# Patient Record
Sex: MALE | Race: WHITE | NOT HISPANIC OR LATINO | Employment: OTHER | URBAN - METROPOLITAN AREA
[De-identification: names, ages, dates, MRNs, and addresses within clinical notes are randomized per-mention and may not be internally consistent; named-entity substitution may affect disease eponyms.]

---

## 2018-10-09 ENCOUNTER — HOSPITAL ENCOUNTER (OUTPATIENT)
Dept: RADIOLOGY | Facility: MEDICAL CENTER | Age: 83
End: 2018-10-09

## 2018-10-09 ENCOUNTER — HOSPITAL ENCOUNTER (INPATIENT)
Facility: MEDICAL CENTER | Age: 83
LOS: 2 days | DRG: 291 | End: 2018-10-11
Attending: EMERGENCY MEDICINE | Admitting: INTERNAL MEDICINE
Payer: MEDICARE

## 2018-10-09 ENCOUNTER — APPOINTMENT (OUTPATIENT)
Dept: RADIOLOGY | Facility: MEDICAL CENTER | Age: 83
DRG: 291 | End: 2018-10-09
Attending: SURGERY
Payer: MEDICARE

## 2018-10-09 DIAGNOSIS — R06.00 DYSPNEA, UNSPECIFIED TYPE: ICD-10-CM

## 2018-10-09 DIAGNOSIS — E87.6 HYPOKALEMIA: ICD-10-CM

## 2018-10-09 DIAGNOSIS — K81.9 CHOLECYSTITIS: ICD-10-CM

## 2018-10-09 DIAGNOSIS — R60.9 PERIPHERAL EDEMA: ICD-10-CM

## 2018-10-09 DIAGNOSIS — I50.813 ACUTE ON CHRONIC RIGHT-SIDED CONGESTIVE HEART FAILURE (HCC): Primary | ICD-10-CM

## 2018-10-09 DIAGNOSIS — R79.89 ELEVATED LFTS: ICD-10-CM

## 2018-10-09 DIAGNOSIS — I50.9 ACUTE CONGESTIVE HEART FAILURE, UNSPECIFIED HEART FAILURE TYPE (HCC): ICD-10-CM

## 2018-10-09 PROBLEM — E87.1 HYPONATREMIA: Status: ACTIVE | Noted: 2018-10-09

## 2018-10-09 PROBLEM — K80.20 CHOLELITHIASIS: Status: ACTIVE | Noted: 2018-10-09

## 2018-10-09 PROBLEM — E78.5 HYPERLIPIDEMIA: Status: ACTIVE | Noted: 2018-10-09

## 2018-10-09 PROBLEM — N40.0 BENIGN PROSTATIC HYPERPLASIA: Status: ACTIVE | Noted: 2018-10-09

## 2018-10-09 PROBLEM — J45.909 ASTHMA: Status: ACTIVE | Noted: 2018-10-09

## 2018-10-09 PROBLEM — D72.829 LEUKOCYTOSIS: Status: ACTIVE | Noted: 2018-10-09

## 2018-10-09 PROBLEM — M10.9 GOUT: Status: ACTIVE | Noted: 2018-10-09

## 2018-10-09 PROBLEM — I10 HYPERTENSION: Status: ACTIVE | Noted: 2018-10-09

## 2018-10-09 LAB
EKG IMPRESSION: NORMAL
MAGNESIUM SERPL-MCNC: 1.7 MG/DL (ref 1.5–2.5)
PHOSPHATE SERPL-MCNC: 3.3 MG/DL (ref 2.5–4.5)
TROPONIN I SERPL-MCNC: 1.08 NG/ML (ref 0–0.04)

## 2018-10-09 PROCEDURE — 93005 ELECTROCARDIOGRAM TRACING: CPT | Performed by: STUDENT IN AN ORGANIZED HEALTH CARE EDUCATION/TRAINING PROGRAM

## 2018-10-09 PROCEDURE — A9537 TC99M MEBROFENIN: HCPCS

## 2018-10-09 PROCEDURE — 99223 1ST HOSP IP/OBS HIGH 75: CPT | Performed by: INTERNAL MEDICINE

## 2018-10-09 PROCEDURE — 94760 N-INVAS EAR/PLS OXIMETRY 1: CPT

## 2018-10-09 PROCEDURE — 700111 HCHG RX REV CODE 636 W/ 250 OVERRIDE (IP): Performed by: STUDENT IN AN ORGANIZED HEALTH CARE EDUCATION/TRAINING PROGRAM

## 2018-10-09 PROCEDURE — 83735 ASSAY OF MAGNESIUM: CPT

## 2018-10-09 PROCEDURE — 99233 SBSQ HOSP IP/OBS HIGH 50: CPT | Mod: GC | Performed by: INTERNAL MEDICINE

## 2018-10-09 PROCEDURE — 93005 ELECTROCARDIOGRAM TRACING: CPT | Performed by: EMERGENCY MEDICINE

## 2018-10-09 PROCEDURE — 700102 HCHG RX REV CODE 250 W/ 637 OVERRIDE(OP): Performed by: INTERNAL MEDICINE

## 2018-10-09 PROCEDURE — 700102 HCHG RX REV CODE 250 W/ 637 OVERRIDE(OP): Performed by: STUDENT IN AN ORGANIZED HEALTH CARE EDUCATION/TRAINING PROGRAM

## 2018-10-09 PROCEDURE — 99285 EMERGENCY DEPT VISIT HI MDM: CPT

## 2018-10-09 PROCEDURE — 93010 ELECTROCARDIOGRAM REPORT: CPT | Performed by: INTERNAL MEDICINE

## 2018-10-09 PROCEDURE — 84484 ASSAY OF TROPONIN QUANT: CPT | Mod: 91

## 2018-10-09 PROCEDURE — 36415 COLL VENOUS BLD VENIPUNCTURE: CPT

## 2018-10-09 PROCEDURE — 770020 HCHG ROOM/CARE - TELE (206)

## 2018-10-09 PROCEDURE — 90662 IIV NO PRSV INCREASED AG IM: CPT | Performed by: INTERNAL MEDICINE

## 2018-10-09 PROCEDURE — 90471 IMMUNIZATION ADMIN: CPT

## 2018-10-09 PROCEDURE — A9270 NON-COVERED ITEM OR SERVICE: HCPCS | Performed by: INTERNAL MEDICINE

## 2018-10-09 PROCEDURE — 84100 ASSAY OF PHOSPHORUS: CPT

## 2018-10-09 PROCEDURE — A9270 NON-COVERED ITEM OR SERVICE: HCPCS | Performed by: STUDENT IN AN ORGANIZED HEALTH CARE EDUCATION/TRAINING PROGRAM

## 2018-10-09 PROCEDURE — 700111 HCHG RX REV CODE 636 W/ 250 OVERRIDE (IP): Performed by: INTERNAL MEDICINE

## 2018-10-09 RX ORDER — METOLAZONE 5 MG/1
5 TABLET ORAL DAILY
Status: ON HOLD | COMMUNITY
End: 2018-10-10

## 2018-10-09 RX ORDER — FINASTERIDE 5 MG/1
5 TABLET, FILM COATED ORAL DAILY
Status: DISCONTINUED | OUTPATIENT
Start: 2018-10-10 | End: 2018-10-11 | Stop reason: HOSPADM

## 2018-10-09 RX ORDER — ALBUTEROL SULFATE 90 UG/1
2 AEROSOL, METERED RESPIRATORY (INHALATION) EVERY 6 HOURS PRN
COMMUNITY

## 2018-10-09 RX ORDER — FUROSEMIDE 10 MG/ML
80 INJECTION INTRAMUSCULAR; INTRAVENOUS
Status: DISCONTINUED | OUTPATIENT
Start: 2018-10-10 | End: 2018-10-10

## 2018-10-09 RX ORDER — FUROSEMIDE 40 MG/1
80 TABLET ORAL DAILY
Status: ON HOLD | COMMUNITY
End: 2018-10-10

## 2018-10-09 RX ORDER — TRAMADOL HYDROCHLORIDE 50 MG/1
50 TABLET ORAL EVERY 8 HOURS PRN
COMMUNITY

## 2018-10-09 RX ORDER — ZOLPIDEM TARTRATE 5 MG/1
5 TABLET ORAL NIGHTLY PRN
Status: DISCONTINUED | OUTPATIENT
Start: 2018-10-09 | End: 2018-10-11 | Stop reason: HOSPADM

## 2018-10-09 RX ORDER — SIMVASTATIN 40 MG
40 TABLET ORAL EVERY EVENING
Status: DISCONTINUED | OUTPATIENT
Start: 2018-10-09 | End: 2018-10-11 | Stop reason: HOSPADM

## 2018-10-09 RX ORDER — AMOXICILLIN 250 MG
2 CAPSULE ORAL 2 TIMES DAILY
Status: DISCONTINUED | OUTPATIENT
Start: 2018-10-09 | End: 2018-10-11 | Stop reason: HOSPADM

## 2018-10-09 RX ORDER — MONTELUKAST SODIUM 10 MG/1
10 TABLET ORAL DAILY
Status: DISCONTINUED | OUTPATIENT
Start: 2018-10-09 | End: 2018-10-11 | Stop reason: HOSPADM

## 2018-10-09 RX ORDER — ALLOPURINOL 100 MG/1
100 TABLET ORAL DAILY
Status: DISCONTINUED | OUTPATIENT
Start: 2018-10-10 | End: 2018-10-11 | Stop reason: HOSPADM

## 2018-10-09 RX ORDER — TAMSULOSIN HYDROCHLORIDE 0.4 MG/1
0.4 CAPSULE ORAL
Status: DISCONTINUED | OUTPATIENT
Start: 2018-10-09 | End: 2018-10-11 | Stop reason: HOSPADM

## 2018-10-09 RX ORDER — METOLAZONE 5 MG/1
5 TABLET ORAL DAILY
Status: DISCONTINUED | OUTPATIENT
Start: 2018-10-10 | End: 2018-10-09

## 2018-10-09 RX ORDER — BUDESONIDE AND FORMOTEROL FUMARATE DIHYDRATE 160; 4.5 UG/1; UG/1
2 AEROSOL RESPIRATORY (INHALATION)
Status: DISCONTINUED | OUTPATIENT
Start: 2018-10-09 | End: 2018-10-10

## 2018-10-09 RX ORDER — ALBUTEROL SULFATE 90 UG/1
2 AEROSOL, METERED RESPIRATORY (INHALATION) EVERY 6 HOURS PRN
Status: DISCONTINUED | OUTPATIENT
Start: 2018-10-09 | End: 2018-10-11 | Stop reason: HOSPADM

## 2018-10-09 RX ORDER — FUROSEMIDE 10 MG/ML
80 INJECTION INTRAMUSCULAR; INTRAVENOUS ONCE
Status: COMPLETED | OUTPATIENT
Start: 2018-10-09 | End: 2018-10-09

## 2018-10-09 RX ORDER — BISACODYL 10 MG
10 SUPPOSITORY, RECTAL RECTAL
Status: DISCONTINUED | OUTPATIENT
Start: 2018-10-09 | End: 2018-10-11 | Stop reason: HOSPADM

## 2018-10-09 RX ORDER — POLYETHYLENE GLYCOL 3350 17 G/17G
1 POWDER, FOR SOLUTION ORAL
Status: DISCONTINUED | OUTPATIENT
Start: 2018-10-09 | End: 2018-10-11 | Stop reason: HOSPADM

## 2018-10-09 RX ORDER — METOLAZONE 5 MG/1
2.5 TABLET ORAL DAILY
Status: DISCONTINUED | OUTPATIENT
Start: 2018-10-10 | End: 2018-10-09

## 2018-10-09 RX ORDER — FUROSEMIDE 10 MG/ML
20 INJECTION INTRAMUSCULAR; INTRAVENOUS ONCE
Status: DISCONTINUED | OUTPATIENT
Start: 2018-10-09 | End: 2018-10-09 | Stop reason: CLARIF

## 2018-10-09 RX ORDER — PANTOPRAZOLE SODIUM 40 MG/1
40 TABLET, DELAYED RELEASE ORAL DAILY
COMMUNITY

## 2018-10-09 RX ADMIN — TAMSULOSIN HYDROCHLORIDE 0.4 MG: 0.4 CAPSULE ORAL at 17:05

## 2018-10-09 RX ADMIN — ENOXAPARIN SODIUM 40 MG: 100 INJECTION SUBCUTANEOUS at 16:18

## 2018-10-09 RX ADMIN — SIMVASTATIN 40 MG: 40 TABLET, FILM COATED ORAL at 17:05

## 2018-10-09 RX ADMIN — FUROSEMIDE 80 MG: 10 INJECTION, SOLUTION INTRAMUSCULAR; INTRAVENOUS at 16:19

## 2018-10-09 RX ADMIN — MONTELUKAST SODIUM 10 MG: 10 TABLET, FILM COATED ORAL at 17:05

## 2018-10-09 RX ADMIN — INFLUENZA A VIRUS A/MICHIGAN/45/2015 X-275 (H1N1) ANTIGEN (FORMALDEHYDE INACTIVATED), INFLUENZA A VIRUS A/SINGAPORE/INFIMH-16-0019/2016 IVR-186 (H3N2) ANTIGEN (FORMALDEHYDE INACTIVATED), AND INFLUENZA B VIRUS B/MARYLAND/15/2016 BX-69A (A B/COLORADO/6/2017-LIKE VIRUS) ANTIGEN (FORMALDEHYDE INACTIVATED) 0.5 ML: 60; 60; 60 INJECTION, SUSPENSION INTRAMUSCULAR at 20:42

## 2018-10-09 RX ADMIN — POTASSIUM BICARBONATE 50 MEQ: 25 TABLET, EFFERVESCENT ORAL at 17:05

## 2018-10-09 RX ADMIN — BUDESONIDE AND FORMOTEROL FUMARATE DIHYDRATE 2 PUFF: 160; 4.5 AEROSOL RESPIRATORY (INHALATION) at 18:56

## 2018-10-09 ASSESSMENT — ENCOUNTER SYMPTOMS
PND: 1
SHORTNESS OF BREATH: 1
FEVER: 0
BLURRED VISION: 0
VOMITING: 0
DIZZINESS: 0
COUGH: 0
BRUISES/BLEEDS EASILY: 0
NAUSEA: 0
HEADACHES: 0
POLYDIPSIA: 0
MYALGIAS: 0
ORTHOPNEA: 1
DOUBLE VISION: 0
WHEEZING: 0
BACK PAIN: 1
FALLS: 0
DIARRHEA: 0
ABDOMINAL PAIN: 1
CHILLS: 0
ABDOMINAL PAIN: 0
NERVOUS/ANXIOUS: 0

## 2018-10-09 ASSESSMENT — LIFESTYLE VARIABLES
DO YOU DRINK ALCOHOL: NO
EVER_SMOKED: YES
SUBSTANCE_ABUSE: 0
EVER_SMOKED: YES

## 2018-10-09 ASSESSMENT — COGNITIVE AND FUNCTIONAL STATUS - GENERAL
SUGGESTED CMS G CODE MODIFIER DAILY ACTIVITY: CH
MOBILITY SCORE: 24
DAILY ACTIVITIY SCORE: 24
SUGGESTED CMS G CODE MODIFIER MOBILITY: CH

## 2018-10-09 ASSESSMENT — COPD QUESTIONNAIRES
DO YOU EVER COUGH UP ANY MUCUS OR PHLEGM?: NO/ONLY WITH OCCASIONAL COLDS OR INFECTIONS
DURING THE PAST 4 WEEKS HOW MUCH DID YOU FEEL SHORT OF BREATH: NONE/LITTLE OF THE TIME
HAVE YOU SMOKED AT LEAST 100 CIGARETTES IN YOUR ENTIRE LIFE: YES
COPD SCREENING SCORE: 4

## 2018-10-09 ASSESSMENT — PATIENT HEALTH QUESTIONNAIRE - PHQ9
1. LITTLE INTEREST OR PLEASURE IN DOING THINGS: NOT AT ALL
2. FEELING DOWN, DEPRESSED, IRRITABLE, OR HOPELESS: NOT AT ALL
SUM OF ALL RESPONSES TO PHQ9 QUESTIONS 1 AND 2: 0

## 2018-10-09 ASSESSMENT — PAIN SCALES - GENERAL
PAINLEVEL_OUTOF10: 0
PAINLEVEL_OUTOF10: 0

## 2018-10-09 NOTE — ED NOTES
Report to floor RN. Floor made this RN aware that a tele monitor will be brought to bedside so pt can go to [facility] and then to floor.

## 2018-10-09 NOTE — H&P
"      Internal Medicine Admitting History and Physical    Note Author: Glen Ratliff M.D.       Name Thuy Guidry 1935   Age/Sex 82 y.o. male   MRN [id number]   Code Status Full     After 5PM or if no immediate response to page, please call for cross-coverage  Attending/Team: Dr. Wilkins See Patient List for primary contact information  Call [phone] to page    1st Call - Day Intern (R1):   Dr. Ratliff 2nd Call - Day Sr. Resident (R2/R3):   Dr. Corado       Chief Complaint:   Shortness of breath    HPI:  Mr. Ennis is an 82 year old gentleman with a past medical history significant for heart failure with preserved ejection fraction, aortic and mitral stenosis s/p valve replacement, dyslipidemia, hypertension, and BPH who presents complaining of a 1-week history of shortness of breath exacerbated by recumbency and activity. He endorses a long history of paroxysmal nocturnal dyspnea, which he states has not been any worse lately, as well as significant orthopnea. He has also noticed weight gain of approximately 10 pounds in the past 2 days accompanied by peripheral edema, especially in the lower extremities. He recently saw his cardiologist Dr. Knox, in [place], on 2018, where he was told that his \"aortic valve was getting tight.\" Dr. Knox was also concerned about his mitral stenosis.     He denies any history, recent or remote, of chest pain, nausea, vomiting, or jaw pain.       Review of Systems   Constitutional: Negative for chills and fever.   Eyes: Negative for blurred vision and double vision.   Respiratory: Positive for shortness of breath. Negative for cough and wheezing.    Cardiovascular: Positive for orthopnea, leg swelling and PND. Negative for chest pain.   Gastrointestinal: Negative for abdominal pain, diarrhea, nausea and vomiting.   Genitourinary: Negative for dysuria, frequency, hematuria and urgency.   Musculoskeletal: Negative for falls and myalgias.   Skin: " Negative for itching and rash.   Neurological: Negative for dizziness and headaches.   Endo/Heme/Allergies: Negative for polydipsia. Does not bruise/bleed easily.   Psychiatric/Behavioral: Negative for substance abuse. The patient is not nervous/anxious.              Past Medical History (Chronic medical problem, known complications and current treatment)    HFpEF, last EF 60-65% in 3/2018  DLD  HTN  BPH   Gout    Past Surgical History:  Past Surgical History:   Procedure Laterality Date   • OTHER      'open heart surgery'   • OTHER ABDOMINAL SURGERY      3x hernia repair       Current Outpatient Medications:  Home Medications     Reviewed by Ofelia Frausto (Pharmacy Tech) on 10/09/18 at 1119  Med List Status: Complete   Medication Last Dose Status   albuterol 108 (90 Base) MCG/ACT Aero Soln inhalation aerosol 2 DAYS Active   allopurinol (ZYLOPRIM) 100 MG Tab 10/8/2018 Active   aspirin EC (ECOTRIN) 81 MG Tablet Delayed Response 10/8/2018 Active   dutasteride (AVODART) 0.5 MG capsule 10/8/2018 Active   fluticasone-salmeterol (ADVAIR) 250-50 MCG/DOSE AEROSOL POWDER, BREATH ACTIVATED 10/7/2018 Active   furosemide (LASIX) 40 MG Tab 10/8/2018 Active   metOLazone (ZAROXOLYN) 5 MG Tab 10/8/2018 Active   montelukast (SINGULAIR) 10 MG Tab 10/8/2018 Active   Multiple Vitamins-Minerals (PRESERVISION AREDS 2 PO) 10/8/2018 Active   pantoprazole (PROTONIX) 40 MG Tablet Delayed Response 10/8/2018 Active   potassium chloride SA (KDUR) 20 MEQ Tab CR 10/9/2018 Active   silodosin (RAPAFLO) 8 MG Cap capsule 10/8/2018 Active   simvastatin (ZOCOR) 40 MG Tab 10/8/2018 Active   tramadol (ULTRAM) 50 MG Tab 4 DAYS Active   zolpidem (AMBIEN) 5 MG Tab 10/7/2018 Active                Medication Allergy/Sensitivities:  No Known Allergies      Family History (mandatory)   Family History   Problem Relation Age of Onset   • No Known Problems Mother         Lived to >100 years old   • Cancer Father          of unknown cancer @ 32 years  "old       Social History (mandatory)   Social History     Social History   • Marital status:      Spouse name: N/A   • Number of children: N/A   • Years of education: N/A     Occupational History   • Not on file.     Social History Main Topics   • Smoking status: Former Smoker   • Smokeless tobacco: Never Used   • Alcohol use Yes      Comment: 2-3 drinks nightly   • Drug use: No   • Sexual activity: Not on file     Other Topics Concern   • Not on file     Social History Narrative   • No narrative on file     Living situation: Lives with wife in [place]  PCP : Pcp Not In Computer    Physical Exam     Vitals:    10/09/18 1033 10/09/18 1200 10/09/18 1551 10/09/18 1600   BP: 122/58  105/57    Pulse: 79 78 76 83   Resp: 18 16 20 18   Temp: 36.6 °C (97.8 °F)  36.1 °C (97 °F)    SpO2: 96% 96% 92% 94%   Weight: 94.3 kg (208 lb)      Height: 1.676 m (5' 6\")        Body mass index is 33.57 kg/m².  /57   Pulse 83   Temp 36.1 °C (97 °F)   Resp 18   Ht 1.676 m (5' 6\")   Wt 94.3 kg (208 lb)   SpO2 94%   BMI 33.57 kg/m²   O2 therapy: Pulse Oximetry: 94 %, O2 (LPM): 0, O2 Delivery: None (Room Air)    Physical Exam   Constitutional: He is oriented to person, place, and time.   HENT:   Mallampati 2   Cardiovascular: Normal rate, regular rhythm and intact distal pulses.    5/6 holosystolic murmur radiating to carotids  +thrill  No JVD   Pulmonary/Chest: He has no rales.   Lungs clear to auscultation bilaterally, no wheezes or crackles   Abdominal: Bowel sounds are normal.   Distended abdominal veins, telangiectasias, obese, no hepatosplenomegaly, no distension, no tenderness   Musculoskeletal: He exhibits edema (3+ pitting edema bilaterally).   Neurological: He is alert and oriented to person, place, and time.   Skin:   Lower extremities cool bilaterally         Data Review       Old Records Request:   Completed  Current Records review/summary: Deferred    Lab Data Review:  Recent Results (from the past 24 " hour(s))   CBC WITH DIFFERENTIAL    Collection Time: 10/09/18  5:40 AM   Result Value Ref Range    WBC 23.7 (H) 4.8 - 10.8 K/uL    RBC 4.50 (L) 4.70 - 6.10 M/uL    Hemoglobin 13.1 (L) 14.0 - 18.0 g/dL    Hematocrit 38.7 (L) 42.0 - 52.0 %    MCV 86.0 80.0 - 94.0 fL    MCH 29.1 28.7 - 33.1 pg    MCHC 33.9 33.0 - 37.0 g/dL    RDW 17.7 (H) 11.5 - 14.5 %    Platelet Count 186 130 - 400 K/uL    MPV 14.3 (H) 7.4 - 10.4 fL    Neutrophils Automated 80.5 (H) 39.0 - 70.0 %    Lymphocytes Automated 10.3 (L) 21.0 - 50.0 %    Monocytes Automated 8.5 1.7 - 10.0 %    Eosinophils Automated 0.5 0.0 - 5.0 %    Basophils Automated 0.2 0.0 - 3.0 %    Abs Neutrophils Automated 19.0 (H) 1.8 - 7.7 K/uL    Abs Lymph Automated 2.4 1.2 - 4.8 K/uL    Monos (Absolute) 2.0 (H) 0.2 - 0.9 K/uL    Neutrophils-Polys 80 (H) 39 - 70 %    Lymphocytes 11 (L) 21 - 50 %    Monocytes 6 2 - 9 %    Nucleated RBC 1 /100 WBC    Neutrophils (Absolute) 19.7 (H) 1.8 - 7.7 K/uL   COMP METABOLIC PANEL    Collection Time: 10/09/18  5:40 AM   Result Value Ref Range    Sodium 131 (L) 136 - 145 mmol/L    Potassium 4.2 3.5 - 5.1 mmol/L    Chloride 99 98 - 107 mmol/L    Co2 19 (L) 20 - 29 mmol/L    Anion Gap 13 (H) 4 - 12 mmol/L    Glucose 121 (H) 70 - 100 mg/dL    Creatinine 1.2 0.7 - 1.3 mg/dL    Calcium 8.5 8.5 - 10.1 mg/dL    AST(SGOT) 195 (H) 10 - 37 U/L    ALT(SGPT) 550 (H) 12 - 78 U/L    Alkaline Phosphatase 305 (H) 34 - 120 U/L    Total Bilirubin 0.7 0.1 - 1.2 mg/dL    Albumin 2.8 (L) 3.5 - 5.0 g/dL    Total Protein 6.3 (L) 6.4 - 8.3 g/dL    A-G Ratio 0.8     Bun 38 (H) 9 - 25 mg/dL   TROPONIN    Collection Time: 10/09/18  5:40 AM   Result Value Ref Range    Troponin I 0.56 (HH) 0.00 - 0.06 ng/mL   BTYPE NATRIURETIC PEPTIDE    Collection Time: 10/09/18  5:40 AM   Result Value Ref Range    B Natriuretic Peptide 13231 pg/mL   VENOUS BLOOD GAS    Collection Time: 10/09/18  5:40 AM   Result Value Ref Range    Venous Bg Ph 7.52 (H) 7.35 - 7.45    Venous Bg Pco2 23.4  (L) 36.0 - 48.0 mmHg    Venous Bg Po2 54.4 mmHg    Venous Bg Hco3 18.9 (L) 24.0 - 28.0 mmol/L    Venous Bg Base Excess -2.0 -3.0 - 3.0 mmol/L   ESTIMATED GFR    Collection Time: 10/09/18  5:40 AM   Result Value Ref Range    GFR If African American >60 >60 mL/min/1.73 m 2    GFR If Non African American 58 (A) >60 mL/min/1.73 m 2   DIFFERENTIAL MANUAL    Collection Time: 10/09/18  5:40 AM   Result Value Ref Range    Bands-Stabs 3 0 - 6 %    Plt Estimation Adequate     Polychromia 1+    LACTIC ACID    Collection Time: 10/09/18  7:25 AM   Result Value Ref Range    Lactic Acid 2.6 (H) 0.0 - 2.0 mmol/L   TROPONIN    Collection Time: 10/09/18  7:25 AM   Result Value Ref Range    Troponin I 0.53 (HH) 0.00 - 0.06 ng/mL   URINALYSIS CULTURE, IF INDICATED    Collection Time: 10/09/18  8:45 AM   Result Value Ref Range    Color YELLOW     Character CLEAR     Specific Gravity 1.010 <1.035    Ph 7.0 5.0 - 8.0    Glucose NEGATIVE Negative mg/dL    Ketones NEGATIVE Negative mg/dL    Protein NEGATIVE Negative mg/dL    Bilirubin NEGATIVE Negative    Urobilinogen, Urine 0.2 Negative    Nitrite NEGATIVE Negative    Leukocyte Esterase NEGATIVE Negative    Occult Blood NEGATIVE Negative    Culture Indicated No UA Culture   EKG    Collection Time: 10/09/18 10:42 AM   Result Value Ref Range    Report       [facility]    Test Date:  2018-10-09  Pt Name:    RIMA GARDNER            Department: ER  MRN:        [id number]                      Room:        03  Gender:     Male                         Technician: [id number]  :        1935                   Requested By:ER TRIAGE PROTOCOL  Order #:    [id number]                    Reading MD: VIGNESH RAMOS MD    Measurements  Intervals                                Axis  Rate:       79                           P:          45  NJ:         216                          QRS:        52  QRSD:       130                          T:          234  QT:          412  QTc:        473    Interpretive Statements  SINUS RHYTHM  BORDERLINE AV CONDUCTION DELAY  PROBABLE LEFT ATRIAL ABNORMALITY  LEFT BUNDLE BRANCH BLOCK  No previous ECG available for comparison    Electronically Signed On 10-9-2018 11:51:17 PDT by VIGNESH RAMOS MD         Imaging/Procedures Review:    Independant Imaging Review: Completed  NM-HEPATOBILIARY SCAN   Final Result      No scintigraphic evidence of cholecystitis or biliary obstruction      OUTSIDE IMAGES-CT CHEST   Final Result      EC-ECHOCARDIOGRAM COMPLETE W/O CONT    (Results Pending)            EKG:   EKG Independant Review: Completed  QTc:473, HR: 79, Normal Sinus Rhythm, left bundle branch block, p pulmonale    Records reviewed and summarized in current documentation :  Yes         Assessment/Plan     Acute on chronic right-sided congestive heart failure (HCC)- (present on admission)   Assessment & Plan    Assessment: Patient presents with shortness of breath, however he has no findings of crackles on lung exam and only minimal fissure fluid on CT. He does have significant peripheral edema, however, and an elevated BNP of ~35591 as well as elevated transaminases likely secondary due to hepatic congestion. This appears to be more of a right-sided HF picture due to the relative sparing of his lungs.     He takes lasix at home, 40mg PO BID and Metolazone for a known history of heart failure with preserved ejection fraction, last EF 60-65% in 3/2018. States this is secondary to his use of fenfluramine/phentermine previously.  He does have a history of aortic and mitral valve replacement and follows with cardiology for mitral stenosis and aortic stenosis, and physical exam demonstrates a loud 5/6 systolic murmur radiating to carotids indicative of aortic stenosis.     Troponins have been elevated and holding stable, 0.56 -> 0.53 -> 1.08, likely due to demand ischemia. EKG shows LBBB with p waves indicative of possible tricuspid regurgitation vs  pulmonary hypertension.    Patient had some olivia-cholecystic fluid which is likely due to congestion as HIDA scan was negative.    Plan:  Echocardiogram pending, cardiology on board  Lasix 80mg IV once, will do 40 TID IV tomorrow.   Continue metolazone, ASA  Follow transaminases to ensure resolution  Trend troponin & EKG x1 more time  Strict I/O  Daily weights  Low-sodium diet        Asthma   Assessment & Plan    Patient endorses history of asthma, says it has been improving and that he hardly ever uses his inhalers anymore. Continuing to offer home inhalers PRN. Imaging does demonstrate centrilobular emphysema and patient has a (remote) significant smoking history indicating likely underlying COPD.        Leukocytosis- (present on admission)   Assessment & Plan    Assessment: Quite elevated with elevated ANC, for a patient who does not appear to have any signs of sepsis, possibly reactive.    Plan:  Continue to follow, consider procalcitonin tomorrow if does not decrease        Elevated LFTs- (present on admission)   Assessment & Plan    Assessment: Likely secondary to hepatic congestion, not cholestatic picture with normal bilirubin although does have elevated ALP, HIDA scan was negative    Plan  Treat acute exacerbation of heart failure, follow transaminases        Hyponatremia   Assessment & Plan    Likely secondary to retention of free water from heart failure, will follow as his CHF exacerbation is treated        Gout   Assessment & Plan    Continue home allopurinol        Hypertension   Assessment & Plan    Currently not hypertensive        Benign prostatic hyperplasia   Assessment & Plan    Finasteride and tamsulosin substituted for home medications        Hyperlipidemia   Assessment & Plan    Continue simvastatin            Anticipated Hospital stay:  >2 midnights        Quality Measures  Quality-Core Measures   Reviewed items::  Labs reviewed, Medications reviewed, EKG reviewed and Radiology images  reviewed  Bull catheter::  No Bull  DVT prophylaxis pharmacological::  Enoxaparin (Lovenox)    PCP: Pcp Not In Computer

## 2018-10-09 NOTE — ASSESSMENT & PLAN NOTE
No abdominal pain  Ultrasound - there are multiple gallstones present.   01/9 - HIDA  Gallbladder activity is confirmed by 30 minutes / No scintigraphic evidence of cholecystitis or biliary obstruction

## 2018-10-09 NOTE — SENIOR ADMIT NOTE
"Mr. Guidry is a 82 y.o. male with PMHx of  CHF, MVR, HTN, COPD who presented to ED for the worsening of LY and SOB. He has known CHF and is on lasix 40 BID + metolazone 5mg, which was increased recently, but pt didn't feel good on higher dose so he cut back to  40 bid. He reports 10 lbs weight gain in past 2 days.     Pt is from [place] and was in [facility] for the vacation. Denies excessive intake of salt in his diet. Denies any chest pain or discomfort. Denies any wheezing, excessive sputum production. Denies any fever/ chills,N/V, pain abdomen, change in bowel movement.    In ED, he had his BMP was elevated, he received 20 mg of lasix, his vitals were WNL.   CT thorax showed small fluid in R major fissure, centrilobar emphysema. Due to pt's elevated liver enzymes with alk phos RUQ US was done that showed \"Cholelithiasis with gallbladder wall thickening and pericholecystic fluid findings suggest acute cholecystitis\". Surgery was consulted.     Exam:  /57   Pulse 83   Temp 36.1 °C (97 °F)   Resp 18   Ht 1.676 m (5' 6\")   Wt 94.3 kg (208 lb)   SpO2 94%   BMI 33.57 kg/m²     General; laying on the bed, looks comfortable, speaking in full sentences  HEENT: AT/NC, PERRLA, EOMI, neck supple, MMM  CHest: CTAB  CVS: RRR, S1S2, holosystolic murmur on Aortic area, no JVP, 3+ pedal edema up to shin.   Abdomen: soft non tender  Neuro: a&O x 3, no FD  Extremity: pitting edema 3+ up to shin   Psych: mood and affect appropriate       Labs:  WBC 23.7  , Cr 1.2, BUN 38, LA 2.6  , , Alk phos 305  Trop 0.5  BNP 35224    EKG: HR 80, NSR, CRIS, LVH  RUQ US:   Cholelithiasis with gallbladder wall thickening and pericholecystic fluid findings suggest acute cholecystitis.  2.  Fatty change of liver.  NM-HEPATOBILIARY SCAN   Final Result      No scintigraphic evidence of cholecystitis or biliary obstruction      OUTSIDE IMAGES-CT CHEST   Final Result      EC-ECHOCARDIOGRAM COMPLETE W/O CONT    " (Results Pending)       Assessment and Plan:  # worsening dyspnea  - 2/2 acute decompensated HF Vs worsening valvular disease VS COPDE, less likely infectious etiology like PNA, has leukocytosis but CT chest non evident of PNA changes,no other sign of systemic infection    - obtain records from Cardiologist   - diuresis with IV lasix and metolazone- total of 100mg IV today, continue monitoring BMP for the guidance of diuresis. K supplement   - ECHO     # acute decompensated HF  - hx of HFpEF,   - on diuresis, no ACE/ARB or BB   - continue diuresis, see above, ECHO   - cardiology consult     # transaminitis   - US RUQ suggestive of cholecystitis, pt is asymptomatic. transaminitis can be from hepatic congestin from RHF  - surgery on board- HIDA negative, follow clinically     # leukocytosis   - no other sign of systemic infection at this point.  - continue to follow    # troponemia  Mildly elevated at 0.5 likely demand ischemia    # HTN  - on amlodipine, continue   - watchful on diuretic therapy    # COPD  - stable, doesn't appear in exacerbation, continue home inhalers     # Gout  - no acute flare  - continue allopurinol       For further details of Assessment & Plan, please refer to H&P by Dr. Ratliff from today

## 2018-10-09 NOTE — PROGRESS NOTES
2 RN skin check performed with ROBLES Mohr.     Trace edema to BLE  Sacrum intact  Right ear scabbed in 3 places  Right collar bone with a small scab, patient reports these scabs are healed shingles lesions from a few weeks ago. Per the charge RN the patient does not need to be isolated.

## 2018-10-09 NOTE — ASSESSMENT & PLAN NOTE
Ultrasound - there are multiple gallstones present. There is mild wall thickening measuring 4 mm.   There is a small amount of pericholecystic fluid.  CBD:  5 mm, which is normal. WBC 23  No abdominal pain  HIDA scan ordered - if filling of gallbladder then no cholecystitis

## 2018-10-09 NOTE — ED TRIAGE NOTES
Thuy Guidry  Pt bib EMS. Pt changed into gown and placed on monitor.     Chief Complaint   Patient presents with   • Shortness of Breath   • Peripheral Edema     Pt was transferred from [facility] for further evaluation. Pt presents with SOB for over 1 wk now. Pt states s/s increased after returning home from [place]. Pt's PCP increased pt's dosage of lasix and metolazone d/t increased peripheral edema, but symptoms did not improve. Pt also c/o lower RIGHT back pain that may be a probable cholecystitis.   Dr Aguilera at bedside now.

## 2018-10-09 NOTE — CONSULTS
General Surgical Consult  10/9/2018    Requesting  Physician: Dr Dhiraj Palumbo    Consulting Physician: Bong Cantu MD.     CC:  Elevated LFT's and gallstones in patient with congestive heart failure    HPI: This is a 82 y.o male presents to [facility] Emergency Department as a transfer from [facility]  for evaluation of shortness of breath onset approximately 1 week ago with associated worsening peripheral edema and weight gain.  Patient reports that the difficulty breathing is exacerbated with any exertion. Patient has a history of CHF, stating that his Lasix and metolazone prescriptions have recently been increased by his PCP with no real change in the leg swelling. Patient lives in [place], and was visiting [facility] on vacation, arriving 5 days ago - planning on staying for 2 weeks.  The patient reports that his symptoms began just prior to leaving on vacation, gradually worsening over his trip and rise in elevation.  He has gained 10 lbs of weight over the last 48 hrs. No complaints of fever, chills, chest pain. Patient has undergone two valve replacements, and a recent evaluation from his Cardiologist showed some aortic valve tightness. Patient was evaluated for his shortness of breath at [facility].  He denies abdominal pain. He has had no nausea or vomiting.    Past Medical History:   Diagnosis Date   • Congestive heart failure (HCC)    • High cholesterol    • Hypertension        Past Surgical History:   Procedure Laterality Date   • OTHER      'open heart surgery'   • OTHER ABDOMINAL SURGERY      3x hernia repair       Current Facility-Administered Medications   Medication Dose Route Frequency Provider Last Rate Last Dose   • senna-docusate (PERICOLACE or SENOKOT S) 8.6-50 MG per tablet 2 Tab  2 Tab Oral BID Glen Ratliff M.D.        And   • polyethylene glycol/lytes (MIRALAX) PACKET 1 Packet  1 Packet Oral QDAY PRN Glen Ratliff M.D.        And   • magnesium  hydroxide (MILK OF MAGNESIA) suspension 30 mL  30 mL Oral QDAY PRN Glen Ratliff M.D.        And   • bisacodyl (DULCOLAX) suppository 10 mg  10 mg Rectal QDAY PRN Glen Ratliff M.D.       • Respiratory Care per Protocol   Nebulization Continuous RT Glen Ratliff M.D.       • enoxaparin (LOVENOX) inj 40 mg  40 mg Subcutaneous DAILY Glen Ratliff M.D.       • albuterol inhaler 2 Puff  2 Puff Inhalation Q6HRS PRN Jorge Wilkins M.D.       • allopurinol (ZYLOPRIM) tablet 100 mg  100 mg Oral DAILY Jorge Wilkins M.D.       • aspirin EC (ECOTRIN) tablet 81 mg  81 mg Oral DAILY Jorge Wilkins M.D.       • dutasteride (AVODART) capsule 0.5 mg  0.5 mg Oral DAILY Jorge Wilkins M.D.       • fluticasone-salmeterol (ADVAIR) 250-50 MCG/DOSE inhaler 1 Puff  1 Puff Inhalation Q12HRS Jorge Wilkins M.D.       • metOLazone (ZAROXOLYN) tablet 5 mg  5 mg Oral DAILY Jorge Wilkins M.D.       • montelukast (SINGULAIR) tablet 10 mg  10 mg Oral DAILY Jorge Wilkins M.D.       • silodosin (RAPAFLO) capsule 8 mg  8 mg Oral AFTER DINNER Jorge Wilkins M.D.       • simvastatin (ZOCOR) tablet 40 mg  40 mg Oral Q EVENING Jorge Wilkins M.D.       • zolpidem (AMBIEN) tablet 5 mg  5 mg Oral HS PRN Jorge Wilkins M.D.         Current Outpatient Prescriptions   Medication Sig Dispense Refill   • Multiple Vitamins-Minerals (PRESERVISION AREDS 2 PO) Take 1 Tab by mouth 2 Times a Day.     • albuterol 108 (90 Base) MCG/ACT Aero Soln inhalation aerosol Inhale 2 Puffs by mouth every 6 hours as needed for Shortness of Breath.     • furosemide (LASIX) 40 MG Tab Take 80 mg by mouth every day.     • pantoprazole (PROTONIX) 40 MG Tablet Delayed Response Take 40 mg by mouth every day.     • metOLazone (ZAROXOLYN) 5 MG Tab Take 5 mg by mouth every day.     • tramadol (ULTRAM) 50 MG Tab Take 50 mg by mouth every 8 hours as needed.     • fluticasone-salmeterol (ADVAIR) 250-50 MCG/DOSE AEROSOL POWDER, BREATH ACTIVATED Inhale 1 Puff by mouth every 12 hours.      • allopurinol (ZYLOPRIM) 100 MG Tab TAKE 1 TABLET EVERY MORNING FOR GOUT PREVENTION     • dutasteride (AVODART) 0.5 MG capsule TAKE ONE CAPSULE EVERY AFTERNOON FOR ENLARGED PROSTATE     • potassium chloride SA (KDUR) 20 MEQ Tab CR Take 20-40 mEq by mouth 4 times a day. 40 meq AM  40 meq afternoon  40 meq evening  20 meq middle of night     • silodosin (RAPAFLO) 8 MG Cap capsule TAKE ONE CAPSULE EVERY AFTERNOON FOR ENLARGED PROSTATE     • simvastatin (ZOCOR) 40 MG Tab Take 40 mg by mouth every evening.     • montelukast (SINGULAIR) 10 MG Tab Take 10 mg by mouth every day.     • zolpidem (AMBIEN) 5 MG Tab Take 10 mg by mouth at bedtime as needed for Sleep.     • aspirin EC (ECOTRIN) 81 MG Tablet Delayed Response Take 81 mg by mouth every day.         Social History     Social History   • Marital status:      Spouse name: N/A   • Number of children: N/A   • Years of education: N/A     Occupational History   • Not on file.     Social History Main Topics   • Smoking status: Former Smoker   • Smokeless tobacco: Never Used   • Alcohol use Yes      Comment: 2-3 drinks nightly   • Drug use: No   • Sexual activity: Not on file     Other Topics Concern   • Not on file     Social History Narrative   • No narrative on file       History reviewed. No pertinent family history.    Allergies:  Patient has no known allergies.    Review of Systems:  Constitutional: Negative for fever, chills, weight loss, malaise/fatigue and diaphoresis.   HENT: Negative for hearing loss, ear pain, nosebleeds, congestion, sore throat, neck pain, and ear discharge.    Eyes: Negative for blurred vision, double vision, and redness.   Respiratory: Negative for cough, sputum production, shortness of breath, wheezing and stridor.  Positive for shortness of breath.  Cardiovascular: Negative for chest pain, palpitations.   Gastrointestinal: Negative for heartburn, nausea, vomiting, abdominal pain, diarrhea, constipation.  Genitourinary: Negative  "for dysuria, urgency, frequency.   Musculoskeletal: Negative for myalgias, back pain, joint pain and falls.   Skin: Negative for itching and rash.  Neurological: Negative for dizziness, loss of consciousness, weakness and headaches.   Endo/Heme/Allergies: Negative for environmental allergies. Does not bruise/bleed easily.   Psychiatric/Behavioral: Negative for depression and substance abuse. The patient is not nervous/anxious.    Physical Exam:  Blood pressure 122/58, pulse 78, temperature 36.6 °C (97.8 °F), resp. rate 16, height 1.676 m (5' 6\"), weight 94.3 kg (208 lb), SpO2 96 %.    Constitutional: Awake, alert, oriented x3.  No acute distress.   Head: No cephalohematoma. Pupils 2-3 mm reactive bilaterally.  No malocclusion.    Neck: No tracheal deviation.  No JVD. No JVD.  FROM  Cardiovascular: Normal rate, regular rhythm, normal heart sounds and intact distal pulses.  Exam reveals no gallop and no friction rub.  No murmur heard.  Pulmonary/Chest:  There is no any chest wall tenderness.  No crepitus. Positive breath sounds bilaterally. BS clear.  Abdominal: Soft, slightly distended. Nontender to palpation. No guarding or rebound tenderness.  Musculoskeletal:  2+ pitting edema BLE extending above his knees.   Right upper extremity grossly atraumatic, palpable radial pulse. 5/5  strength. Full ROM and strength at elbow.  Left upper extremity grossly atraumatic, palpable radial pulse. 5/5  strength. Full ROM and strength at elbow.  Right lower extremity grossly atraumatic. 5/5 strength in ankle plantar flexion and dorsiflexion. No pain and full ROM at right knee and hip.   Left  lower extremity grossly atraumatic. 5/5 strength in ankle plantar flexion and dorsiflexion. No pain and full ROM at left knee and hip.   Back: Midline thoracic and lumbar spines are nontender to palpation.   : Normal male external genitalia. Rectal exam not done.   Neurological: Sensation intact to light touch dorsum and plantar " surfaces of both feet and the medial and lateral aspects of both lower legs.  Sensation intact to light touch dorsum and plantar surfaces of both hands.   Skin: Skin is warm and dry.  No diaphoresis. No erythema. No pallor.     Labs:  Recent Labs      10/09/18   0540   WBC  23.7*   RBC  4.50*   HEMOGLOBIN  13.1*   HEMATOCRIT  38.7*   MCV  86.0   MCH  29.1   MCHC  33.9   RDW  17.7*   PLATELETCT  186   MPV  14.3*     Recent Labs      10/09/18   0540   SODIUM  131*   POTASSIUM  4.2   CHLORIDE  99   CO2  19*   GLUCOSE  121*   BUN  38*   CREATININE  1.2   CALCIUM  8.5         Recent Labs      10/09/18   0540   ASTSGOT  195*   ALTSGPT  550*   TBILIRUBIN  0.7   ALKPHOSPHAT  305*       Radiology:  OUTSIDE IMAGES-CT CHEST   Final Result      NM-HEPATOBILIARY SCAN    (Results Pending)   EC-ECHOCARDIOGRAM COMPLETE W/O CONT    (Results Pending)         Assessment: This is a 82 y.o with CHF exacerbation, elevated LFT's.    Plan:   Admit to medical service.  Diuresis  HIDA scan ordered    Acute on chronic right-sided congestive heart failure (HCC)  Weight gain - 10 lbs in 48 hr  BNP > 18K  2-3 + pitting edema of BLE      Elevated LFTs  Elevated AST  195, ALT  550, TB   0.7, ALK P 305.  Likely related to cardiac failure  Follow    Leukocytosis  WBC 23.7  No evidence of pneumonia on CT  UA pending  Denies abdominal pain    Cholecystitis  Ultrasound - there are multiple gallstones present. There is mild wall thickening measuring 4 mm.   There is a small amount of pericholecystic fluid.  CBD:  5 mm, which is normal. WBC 23  No abdominal pain  HIDA scan ordered - if filling of gallbladder then no cholecystitis    Bong Cantu MD  [facility]  [phone]

## 2018-10-09 NOTE — ED NOTES
Med Rec completed per patient with medication list on computer  Allergies reviewed  No ORAL antibiotics in last 30 days    Verified Potassium several times

## 2018-10-09 NOTE — ED NOTES
Patient to [facility] med with ed tech on tele monitor. Tele RN Estefani brought down tele monitor and placed on it. Per Estefani monitor room called to update on patient.

## 2018-10-09 NOTE — ED PROVIDER NOTES
ED Provider Note    Scribed for Dhiraj Aguilera M.D. by Alida Howell. 10/9/2018, 10:41 AM.    Means of arrival: ambulance  History obtained from: patient  History limited by: none    CHIEF COMPLAINT  Chief Complaint   Patient presents with   • Shortness of Breath   • Peripheral Edema       HPI  Thuy Guidry is a 82 y.o. Male with a history of CHF, high cholesterol and hypertension who presents to the Emergency Department as a transfer from [facility] for evaluation of shortness of breath onset approximately 1 week ago with associated worsening peripheral edema. Patient reports that the difficulty breathing is exacerbated with any exertion. Patient has a history of CHF, stating that his Lasix and metolazone prescriptions have recently been increased by his PCP with no real change in the leg swelling. Patient lives in [place], and was visiting [facility] on vacation, arriving 5 days ago. Patient reports that his symptoms began just prior to leaving on vacation, gradually worsening over his trip and rise in elevation. No complaints of fever, chills, chest pain. Patient has undergone two valve replacements, and a recent evaluation from his Cardiologist showed some aortic valve tightness. Patient was evaluated for his shortness of breath at [place], and also began complaining of right upper abdominal pain and right sided back pain as well, which is new over the last few days. He was then transferred here for evaluation and treatment of heart failure as well as possible cholecystitis suggested by an elevated WBC.     REVIEW OF SYSTEMS  Review of Systems   Constitutional: Negative for chills and fever.   Respiratory: Positive for shortness of breath.    Cardiovascular: Positive for leg swelling. Negative for chest pain.   Gastrointestinal: Positive for abdominal pain.   Musculoskeletal: Positive for back pain.   All other systems reviewed and are negative.      PAST MEDICAL HISTORY   has a past medical  "history of Congestive heart failure (HCC); High cholesterol; and Hypertension.    SURGICAL HISTORY   has a past surgical history that includes other and other abdominal surgery.    SOCIAL HISTORY  Social History   Substance Use Topics   • Smoking status: Former Smoker   • Smokeless tobacco: Never Used   • Alcohol use Yes      Comment: 2-3 drinks nightly      History   Drug Use No       FAMILY HISTORY  Family History   Problem Relation Age of Onset   • No Known Problems Mother         Lived to >100 years old   • Cancer Father          of unknown cancer @ 32 years old       CURRENT MEDICATIONS  Home Medications     Reviewed by Neha Balderas PhT (Pharmacy Tech) on 10/09/18 at 1119  Med List Status: Complete   Medication Last Dose Status   albuterol 108 (90 Base) MCG/ACT Aero Soln inhalation aerosol 2 DAYS Active   allopurinol (ZYLOPRIM) 100 MG Tab 10/8/2018 Active   aspirin EC (ECOTRIN) 81 MG Tablet Delayed Response 10/8/2018 Active   dutasteride (AVODART) 0.5 MG capsule 10/8/2018 Active   fluticasone-salmeterol (ADVAIR) 250-50 MCG/DOSE AEROSOL POWDER, BREATH ACTIVATED 10/7/2018 Active   furosemide (LASIX) 40 MG Tab 10/8/2018 Active   metOLazone (ZAROXOLYN) 5 MG Tab 10/8/2018 Active   montelukast (SINGULAIR) 10 MG Tab 10/8/2018 Active   Multiple Vitamins-Minerals (PRESERVISION AREDS 2 PO) 10/8/2018 Active   pantoprazole (PROTONIX) 40 MG Tablet Delayed Response 10/8/2018 Active   potassium chloride SA (KDUR) 20 MEQ Tab CR 10/9/2018 Active   silodosin (RAPAFLO) 8 MG Cap capsule 10/8/2018 Active   simvastatin (ZOCOR) 40 MG Tab 10/8/2018 Active   tramadol (ULTRAM) 50 MG Tab 4 DAYS Active   zolpidem (AMBIEN) 5 MG Tab 10/7/2018 Active                ALLERGIES  No Known Allergies    PHYSICAL EXAM  VITAL SIGNS: /58   Pulse 79   Temp 36.6 °C (97.8 °F)   Resp 18   Ht 1.676 m (5' 6\")   Wt 94.3 kg (208 lb)   SpO2 96%   BMI 33.57 kg/m²     Constitutional: Well developed, Well nourished, No acute distress, " Non-toxic appearance.   HENT: Normocephalic, Atraumatic, lesions on the ear and face consistent with past shingles, Bilateral external ears normal, oropharynx moist, No oral exudates, Nose normal.   Eyes:conjunctiva is normal, there are no signs of exudate.   Neck: Supple, no meningeal signs.  Lymphatic: No lymphadenopathy noted.   Cardiovascular: Regular rate and rhythm. 4/6 murmur left sternal border, without  gallops or rubs.   Thorax & Lungs: No respiratory distress, but breathing mildly more heavy. diminished throughout otherwise, Lungs are clear to auscultation bilaterally, there are no wheezes no rales. Chest wall is nontender.  Abdomen: Soft, mild RUQ tenderness, nondistended. Bowel sounds are present.   Skin: Warm, Dry, No erythema,   Back: No tenderness  Musculoskeletal: 2+ pitting edema BLE, Good range of motion in all major joints. No tenderness to palpation or major deformities noted. Intact distal pulses, no clubbing, no cyanosis  Neurologic: Alert & oriented x 3, Moving all extremities. No gross abnormalities.    Psychiatric: Affect normal, Judgment normal, Mood normal.     LABS  Results for orders placed or performed during the hospital encounter of 10/09/18   TROPONIN   Result Value Ref Range    Troponin I 1.08 (H) 0.00 - 0.04 ng/mL   EKG   Result Value Ref Range    Report       [facility] Emergency Dept.    Test Date:  2018-10-09  Pt Name:    RIMA GARDNER            Department: ER  MRN:        [id number]                      Room:        03  Gender:     Male                         Technician: [id number]  :        1935                   Requested By:ER TRIAGE PROTOCOL  Order #:    [id number]                    Reading MD: VIGNESH RAMOS MD    Measurements  Intervals                                Axis  Rate:       79                           P:          45  KY:         216                          QRS:        52  QRSD:       130                          T:           234  QT:         412  QTc:        473    Interpretive Statements  SINUS RHYTHM  BORDERLINE AV CONDUCTION DELAY  PROBABLE LEFT ATRIAL ABNORMALITY  LEFT BUNDLE BRANCH BLOCK  No previous ECG available for comparison    Electronically Signed On 10-9-2018 11:51:17 PDT by VIGNESH RAMOS MD         All labs reviewed by me.    EKG  Interpreted by me as above    RADIOLOGY  NM-HEPATOBILIARY SCAN   Final Result      No scintigraphic evidence of cholecystitis or biliary obstruction      OUTSIDE IMAGES-CT CHEST   Final Result      EC-ECHOCARDIOGRAM COMPLETE W/O CONT    (Results Pending)     The radiologist's interpretation of all radiological studies have been reviewed by me.    COURSE & MEDICAL DECISION MAKING  Pertinent Labs & Imaging studies reviewed. (See chart for details)    Review of transferring facility paperwork:    Procedure Component Value Ref Range Date/Time   URINALYSIS CULTURE, IF INDICATED [[id number]] Collected: 10/09/18 0845   Order Status: Completed Specimen: Urine Updated: 10/09/18 0858    Color YELLOW    Character CLEAR    Specific Gravity 1.010 <1.035     Ph 7.0 5.0 - 8.0     Glucose NEGATIVE Negative mg/dL     Ketones NEGATIVE Negative mg/dL     Protein NEGATIVE Negative mg/dL     Bilirubin NEGATIVE Negative     Urobilinogen, Urine 0.2 Negative     Nitrite NEGATIVE Negative     Leukocyte Esterase NEGATIVE Negative     Occult Blood NEGATIVE Negative     Culture Indicated No UA Culture    US-RUQ [[id number]] Resulted: 10/09/18 0839   Order Status: Completed Updated: 10/09/18 0843   Narrative:        Impression:       1.  Cholelithiasis with gallbladder wall thickening and pericholecystic fluid findings suggest acute cholecystitis.  2.  Fatty change of liver.    Ramon Grimaldo MD  10/9/2018 8:39 AM   TROPONIN [[id number]] (Abnormal) Collected: 10/09/18 0725   Order Status: Completed Specimen: Blood Updated: 10/09/18 0833    Troponin I 0.53 (HH) 0.00 - 0.06 ng/mL     Comment: Results verified by laboratory.    Following orders: YES   NO   Physician aware:  YES   NO   Dr.____________________________ notified. Date + time:____________   Nursing notes:____________________________________________________   RN signature:_____________________________Date+time:______________   TROPONIN I  INTERPRETATION   <=0.06    Is not suggestive of myocardial damage.   >0.06    Is suggestive of myocardial damage in the past 9 days.   Serial Troponin I testing and clinical correlation suggested.       CT-CHEST (THORAX) WITH [[id number]] Resulted: 10/09/18 0827   Order Status: Completed Updated: 10/09/18 0830   Narrative:         TECHNIQUE/EXAM DESCRIPTION: CT scan of the chest with contrast, 10/9/2018 6:57 AM.   Impression:       There is a small amount of fluid loculated within the right major fissure.    There is evidence of centrilobular emphysema.    Cardiomegaly is present, along with surgical changes involving the mitral and aortic valves.    I do not see evidence of pulmonary embolus.     LACTIC ACID [[id number]] (Abnormal) Collected: 10/09/18 0725   Order Status: Completed Specimen: Blood Updated: 10/09/18 0734    Lactic Acid 2.6 (H) 0.0 - 2.0 mmol/L    COMP METABOLIC PANEL [[id number]] (Abnormal) Collected: 10/09/18 0540   Order Status: Completed Specimen: Blood Updated: 10/09/18 0644    Sodium 131 (L) 136 - 145 mmol/L     Potassium 4.2 3.5 - 5.1 mmol/L     Chloride 99 98 - 107 mmol/L     Co2 19 (L) 20 - 29 mmol/L     Anion Gap 13 (H) 4 - 12 mmol/L     Glucose 121 (H) 70 - 100 mg/dL     Creatinine 1.2 0.7 - 1.3 mg/dL     Calcium 8.5 8.5 - 10.1 mg/dL     AST(SGOT) 195 (H) 10 - 37 U/L     ALT(SGPT) 550 (H) 12 - 78 U/L     Alkaline Phosphatase 305 (H) 34 - 120 U/L     Total Bilirubin 0.7 0.1 - 1.2 mg/dL     Albumin 2.8 (L) 3.5 - 5.0 g/dL     Total Protein 6.3 (L) 6.4 - 8.3 g/dL     A-G Ratio 0.8    Bun 38 (H) 9 - 25 mg/dL    CBC WITH DIFFERENTIAL [[id number]] (Abnormal) Collected: 10/09/18 0540   Order Status: Completed Specimen: Blood  Updated: 10/09/18 0617    WBC 23.7 (H) 4.8 - 10.8 K/uL     RBC 4.50 (L) 4.70 - 6.10 M/uL     Hemoglobin 13.1 (L) 14.0 - 18.0 g/dL     Hematocrit 38.7 (L) 42.0 - 52.0 %     MCV 86.0 80.0 - 94.0 fL     MCH 29.1 28.7 - 33.1 pg     MCHC 33.9 33.0 - 37.0 g/dL     RDW 17.7 (H) 11.5 - 14.5 %     Platelet Count 186 130 - 400 K/uL     MPV 14.3 (H) 7.4 - 10.4 fL     Neutrophils Automated 80.5 (H) 39.0 - 70.0 %     Lymphocytes Automated 10.3 (L) 21.0 - 50.0 %     Monocytes Automated 8.5 1.7 - 10.0 %     Eosinophils Automated 0.5 0.0 - 5.0 %     Basophils Automated 0.2 0.0 - 3.0 %     Abs Neutrophils Automated 19.0 (H) 1.8 - 7.7 K/uL     Abs Lymph Automated 2.4 1.2 - 4.8 K/uL     Monos (Absolute) 2.0 (H) 0.2 - 0.9 K/uL     Neutrophils-Polys 80 (H) 39 - 70 %     Lymphocytes 11 (L) 21 - 50 %     Monocytes 6 2 - 9 %     Nucleated RBC 1 /100 WBC     Comment: @L=100       Neutrophils (Absolute) 19.7 (H) 1.8 - 7.7 K/uL    DIFFERENTIAL MANUAL [[id number]] Collected: 10/09/18 0540   Order Status: Completed Updated: 10/09/18 0617    Bands-Stabs 3 0 - 6 %     Plt Estimation Adequate    Polychromia 1+   BTYPE NATRIURETIC PEPTIDE [[id number]] Collected: 10/09/18 0540   Order Status: Completed Specimen: Blood Updated: 10/09/18 0616    B Natriuretic Peptide 40004 pg/mL     Comment: N-terminal Pro-Brain Natriuretic Peptide (NT-proBNP)   REFERENCE: 95th Percentile   REFERENCE: Median value   Non-CHF Population        CHF Population (NYHA*)   Age Male   Female                Male  Female   <55  <169   <124        Class I   1639   225   55-64 <155   <207        Class II  2230   950   65-74 <880   <215        Class III 3442  1287   75+  <1616  <1453       Class IV  3741 8933   *Heart failure population categorized   according to the functional classification   system published by the [place] Heart   Association (NYHA).   For the CHF population there is a relationship between the   severity of  the clinical signs and symptoms of CHF and the    median NT-proBNP result.   -------------------------------------------------------------------       ESTIMATED GFR [[id number]] (Abnormal) Collected: 10/09/18 0540   Order Status: Completed Updated: 10/09/18 0616    GFR If African American >60 >60 mL/min/1.73 m 2     GFR If Non African American 58 (A) >60 mL/min/1.73 m 2     Comment: The Estimated Glomerular Filtration Rate calculation is derived   from the IDMS-traceable MDRD Study.  This equation has been   validated on  and  Americans 18 years and older.   For additional info go to www.nkdep.nih.gov or www.kdoqi.org       TROPONIN [[id number]] (Abnormal) Collected: 10/09/18 0540   Order Status: Completed Specimen: Blood Updated: 10/09/18 0616    Troponin I 0.56 (HH) 0.00 - 0.06 ng/mL     Comment: Results verified by laboratory.   Following orders: YES   NO   Physician aware:  YES   NO   Dr.____________________________ notified. Date + time:____________   Nursing notes:____________________________________________________   RN signature:_____________________________Date+time:______________   TROPONIN I  INTERPRETATION   <=0.06    Is not suggestive of myocardial damage.   >0.06    Is suggestive of myocardial damage in the past 9 days.   Serial Troponin I testing and clinical correlation suggested.       VENOUS BLOOD GAS [[id number]] (Abnormal) Collected: 10/09/18 0540   Order Status: Completed Specimen: Blood Updated: 10/09/18 0551    Venous Bg Ph 7.52 (H) 7.35 - 7.45     Venous Bg Pco2 23.4 (L) 36.0 - 48.0 mmHg     Venous Bg Po2 54.4 mmHg     Venous Bg Hco3 18.9 (L) 24.0 - 28.0 mmol/L     Venous Bg Base Excess -2.0 -3.0 - 3.0 mmol/L     Comment: Ref not estab   Venous blood gas reference ranges are based on room air (FIO2 21%)   breathing. Reference ranges determined by [facility](elevation 5285 ft). Venous pO2 reference range not established.             10:41 AM - Patient seen and examined at bedside. Ordered EKG to evaluate  his symptoms. I informed the patient that I would evaluate for other acute processes with lab work and imaging, as well as begin treatment for the heart failure that is evident on the transferring facility lab work. I explained that he would most likely be admitted for stabilization then a long term treatment plan would be established. Patient understands and agrees with treatment plan and admission.     11:02 AM Spoke with [facility] about the patient's condition. Agrees to admit the patient    11:11 AM Spoke with Dr. Cantu, [facility], about the patient's condition. Agrees to follow with the patient for possible surgical intervention of his cholecystitis once CHF has stabilized.      Decision Making:  Mr. Guidry presents today with a history of CHF and hypertension as a transfer from [place] for evaluation and treatment of heart failure and possible cholecystitis. Patient has been experiencing worsening shortness of breath and peripheral edema that have been present for approximately 1 week. He is also experiencing right upper abdominal and right sided back pain that was concerning for cholecystitis with an elevated WBC and concerning abdominal US, findings above. Lab work completed at [place], outlined above also indicates decompensating heart failure. Patient was given Aspirin, Lasix and Zosyn prior to transfer here.  This time I spoke with Dr. Cantu who is on for general surgery for consultation for potential for cholecystitis.  I do feel that this most likely edema secondary to right heart failure surrounding the gallbladder.  The patient does not have any significant clinical findings.  His white blood cell count his however elevated so we will treat empirically with antibiotics which were given prior to arrival.  At this point also speak with [facility] for admission.    DISPOSITION:  Patient will be admitted to [facility] in guarded condition.     FINAL IMPRESSION  1. Acute congestive  heart failure, unspecified heart failure type (HCC)    2. Peripheral edema    3. Dyspnea, unspecified type    4. Cholecystitis          IAlida (Scribe), am scribing for, and in the presence of, Dhiraj Aguilera M.D..    Electronically signed by: Alida Howell (Libbyibe), 10/9/2018    IDhiraj M.D. personally performed the services described in this documentation, as scribed by Alida Howell in my presence, and it is both accurate and complete.    The note accurately reflects work and decisions made by me.  Dhiraj Aguilera  10/9/2018  5:51 PM

## 2018-10-10 ENCOUNTER — PATIENT OUTREACH (OUTPATIENT)
Dept: HEALTH INFORMATION MANAGEMENT | Facility: OTHER | Age: 83
End: 2018-10-10

## 2018-10-10 ENCOUNTER — APPOINTMENT (OUTPATIENT)
Dept: CARDIOLOGY | Facility: MEDICAL CENTER | Age: 83
DRG: 291 | End: 2018-10-10
Attending: STUDENT IN AN ORGANIZED HEALTH CARE EDUCATION/TRAINING PROGRAM
Payer: MEDICARE

## 2018-10-10 LAB
ALBUMIN SERPL BCP-MCNC: 3.1 G/DL (ref 3.2–4.9)
ALBUMIN/GLOB SERPL: 1.3 G/DL
ALP SERPL-CCNC: 206 U/L (ref 30–99)
ALT SERPL-CCNC: 331 U/L (ref 2–50)
ANION GAP SERPL CALC-SCNC: 11 MMOL/L (ref 0–11.9)
ANION GAP SERPL CALC-SCNC: 12 MMOL/L (ref 0–11.9)
ANION GAP SERPL CALC-SCNC: 12 MMOL/L (ref 0–11.9)
AST SERPL-CCNC: 85 U/L (ref 12–45)
BASOPHILS # BLD AUTO: 0.2 % (ref 0–1.8)
BASOPHILS # BLD: 0.04 K/UL (ref 0–0.12)
BILIRUB SERPL-MCNC: 1 MG/DL (ref 0.1–1.5)
BUN SERPL-MCNC: 30 MG/DL (ref 8–22)
BUN SERPL-MCNC: 31 MG/DL (ref 8–22)
BUN SERPL-MCNC: 32 MG/DL (ref 8–22)
CALCIUM SERPL-MCNC: 8.5 MG/DL (ref 8.5–10.5)
CALCIUM SERPL-MCNC: 8.9 MG/DL (ref 8.5–10.5)
CALCIUM SERPL-MCNC: 9.2 MG/DL (ref 8.5–10.5)
CHLORIDE SERPL-SCNC: 100 MMOL/L (ref 96–112)
CHLORIDE SERPL-SCNC: 98 MMOL/L (ref 96–112)
CHLORIDE SERPL-SCNC: 98 MMOL/L (ref 96–112)
CO2 SERPL-SCNC: 25 MMOL/L (ref 20–33)
CO2 SERPL-SCNC: 27 MMOL/L (ref 20–33)
CO2 SERPL-SCNC: 29 MMOL/L (ref 20–33)
CREAT SERPL-MCNC: 0.93 MG/DL (ref 0.5–1.4)
CREAT SERPL-MCNC: 0.96 MG/DL (ref 0.5–1.4)
CREAT SERPL-MCNC: 1.03 MG/DL (ref 0.5–1.4)
EKG IMPRESSION: NORMAL
EOSINOPHIL # BLD AUTO: 0.15 K/UL (ref 0–0.51)
EOSINOPHIL NFR BLD: 0.7 % (ref 0–6.9)
ERYTHROCYTE [DISTWIDTH] IN BLOOD BY AUTOMATED COUNT: 49.9 FL (ref 35.9–50)
GLOBULIN SER CALC-MCNC: 2.4 G/DL (ref 1.9–3.5)
GLUCOSE SERPL-MCNC: 100 MG/DL (ref 65–99)
GLUCOSE SERPL-MCNC: 104 MG/DL (ref 65–99)
GLUCOSE SERPL-MCNC: 95 MG/DL (ref 65–99)
HCT VFR BLD AUTO: 36.7 % (ref 42–52)
HGB BLD-MCNC: 12.5 G/DL (ref 14–18)
IMM GRANULOCYTES # BLD AUTO: 0.65 K/UL (ref 0–0.11)
IMM GRANULOCYTES NFR BLD AUTO: 3.2 % (ref 0–0.9)
LV EJECT FRACT  99904: 35
LV EJECT FRACT MOD 2C 99903: 31.48
LV EJECT FRACT MOD 4C 99902: 44.93
LV EJECT FRACT MOD BP 99901: 42.46
LYMPHOCYTES # BLD AUTO: 1.83 K/UL (ref 1–4.8)
LYMPHOCYTES NFR BLD: 9.1 % (ref 22–41)
MCH RBC QN AUTO: 29.5 PG (ref 27–33)
MCHC RBC AUTO-ENTMCNC: 34.1 G/DL (ref 33.7–35.3)
MCV RBC AUTO: 86.6 FL (ref 81.4–97.8)
MONOCYTES # BLD AUTO: 1.94 K/UL (ref 0–0.85)
MONOCYTES NFR BLD AUTO: 9.6 % (ref 0–13.4)
NEUTROPHILS # BLD AUTO: 15.61 K/UL (ref 1.82–7.42)
NEUTROPHILS NFR BLD: 77.2 % (ref 44–72)
NRBC # BLD AUTO: 0.03 K/UL
NRBC BLD-RTO: 0.1 /100 WBC
PLATELET # BLD AUTO: 185 K/UL (ref 164–446)
PMV BLD AUTO: 14.5 FL (ref 9–12.9)
POTASSIUM SERPL-SCNC: 2.7 MMOL/L (ref 3.6–5.5)
POTASSIUM SERPL-SCNC: 3.4 MMOL/L (ref 3.6–5.5)
POTASSIUM SERPL-SCNC: 3.4 MMOL/L (ref 3.6–5.5)
PROT SERPL-MCNC: 5.5 G/DL (ref 6–8.2)
RBC # BLD AUTO: 4.24 M/UL (ref 4.7–6.1)
SODIUM SERPL-SCNC: 137 MMOL/L (ref 135–145)
SODIUM SERPL-SCNC: 137 MMOL/L (ref 135–145)
SODIUM SERPL-SCNC: 138 MMOL/L (ref 135–145)
WBC # BLD AUTO: 20.2 K/UL (ref 4.8–10.8)

## 2018-10-10 PROCEDURE — 700111 HCHG RX REV CODE 636 W/ 250 OVERRIDE (IP): Performed by: INTERNAL MEDICINE

## 2018-10-10 PROCEDURE — 93005 ELECTROCARDIOGRAM TRACING: CPT | Performed by: STUDENT IN AN ORGANIZED HEALTH CARE EDUCATION/TRAINING PROGRAM

## 2018-10-10 PROCEDURE — 85025 COMPLETE CBC W/AUTO DIFF WBC: CPT

## 2018-10-10 PROCEDURE — A9270 NON-COVERED ITEM OR SERVICE: HCPCS | Performed by: STUDENT IN AN ORGANIZED HEALTH CARE EDUCATION/TRAINING PROGRAM

## 2018-10-10 PROCEDURE — 99232 SBSQ HOSP IP/OBS MODERATE 35: CPT | Mod: GC | Performed by: INTERNAL MEDICINE

## 2018-10-10 PROCEDURE — 700102 HCHG RX REV CODE 250 W/ 637 OVERRIDE(OP): Performed by: STUDENT IN AN ORGANIZED HEALTH CARE EDUCATION/TRAINING PROGRAM

## 2018-10-10 PROCEDURE — 99233 SBSQ HOSP IP/OBS HIGH 50: CPT | Performed by: INTERNAL MEDICINE

## 2018-10-10 PROCEDURE — 93010 ELECTROCARDIOGRAM REPORT: CPT | Performed by: INTERNAL MEDICINE

## 2018-10-10 PROCEDURE — 93306 TTE W/DOPPLER COMPLETE: CPT

## 2018-10-10 PROCEDURE — 80053 COMPREHEN METABOLIC PANEL: CPT

## 2018-10-10 PROCEDURE — 93306 TTE W/DOPPLER COMPLETE: CPT | Mod: 26 | Performed by: INTERNAL MEDICINE

## 2018-10-10 PROCEDURE — 700111 HCHG RX REV CODE 636 W/ 250 OVERRIDE (IP): Performed by: STUDENT IN AN ORGANIZED HEALTH CARE EDUCATION/TRAINING PROGRAM

## 2018-10-10 PROCEDURE — 770020 HCHG ROOM/CARE - TELE (206)

## 2018-10-10 PROCEDURE — 80048 BASIC METABOLIC PNL TOTAL CA: CPT

## 2018-10-10 PROCEDURE — 700102 HCHG RX REV CODE 250 W/ 637 OVERRIDE(OP): Performed by: INTERNAL MEDICINE

## 2018-10-10 PROCEDURE — A9270 NON-COVERED ITEM OR SERVICE: HCPCS | Performed by: INTERNAL MEDICINE

## 2018-10-10 PROCEDURE — 36415 COLL VENOUS BLD VENIPUNCTURE: CPT

## 2018-10-10 RX ORDER — POTASSIUM CHLORIDE 7.45 MG/ML
10 INJECTION INTRAVENOUS
Status: DISCONTINUED | OUTPATIENT
Start: 2018-10-10 | End: 2018-10-10

## 2018-10-10 RX ORDER — FUROSEMIDE 10 MG/ML
40 INJECTION INTRAMUSCULAR; INTRAVENOUS
Status: DISCONTINUED | OUTPATIENT
Start: 2018-10-10 | End: 2018-10-10

## 2018-10-10 RX ORDER — TORSEMIDE 100 MG/1
100 TABLET ORAL
Status: DISCONTINUED | OUTPATIENT
Start: 2018-10-10 | End: 2018-10-11 | Stop reason: HOSPADM

## 2018-10-10 RX ORDER — POTASSIUM CHLORIDE 20 MEQ/1
40 TABLET, EXTENDED RELEASE ORAL ONCE
Status: COMPLETED | OUTPATIENT
Start: 2018-10-10 | End: 2018-10-10

## 2018-10-10 RX ORDER — TORSEMIDE 100 MG/1
100 TABLET ORAL DAILY
Qty: 30 TAB | Refills: 3 | Status: SHIPPED
Start: 2018-10-10 | End: 2018-10-10

## 2018-10-10 RX ORDER — MAGNESIUM SULFATE HEPTAHYDRATE 40 MG/ML
2 INJECTION, SOLUTION INTRAVENOUS ONCE
Status: COMPLETED | OUTPATIENT
Start: 2018-10-10 | End: 2018-10-10

## 2018-10-10 RX ORDER — TORSEMIDE 100 MG/1
100 TABLET ORAL DAILY
Qty: 30 TAB | Refills: 3 | Status: SHIPPED | OUTPATIENT
Start: 2018-10-10 | End: 2018-10-11

## 2018-10-10 RX ORDER — BUDESONIDE AND FORMOTEROL FUMARATE DIHYDRATE 160; 4.5 UG/1; UG/1
2 AEROSOL RESPIRATORY (INHALATION) 2 TIMES DAILY
Status: DISCONTINUED | OUTPATIENT
Start: 2018-10-10 | End: 2018-10-11 | Stop reason: HOSPADM

## 2018-10-10 RX ADMIN — TORSEMIDE 100 MG: 100 TABLET ORAL at 15:10

## 2018-10-10 RX ADMIN — ENOXAPARIN SODIUM 40 MG: 100 INJECTION SUBCUTANEOUS at 05:09

## 2018-10-10 RX ADMIN — SIMVASTATIN 40 MG: 40 TABLET, FILM COATED ORAL at 17:41

## 2018-10-10 RX ADMIN — MAGNESIUM SULFATE HEPTAHYDRATE 2 G: 40 INJECTION, SOLUTION INTRAVENOUS at 05:25

## 2018-10-10 RX ADMIN — POTASSIUM BICARBONATE 50 MEQ: 25 TABLET, EFFERVESCENT ORAL at 22:46

## 2018-10-10 RX ADMIN — BUDESONIDE AND FORMOTEROL FUMARATE DIHYDRATE 2 PUFF: 160; 4.5 AEROSOL RESPIRATORY (INHALATION) at 08:12

## 2018-10-10 RX ADMIN — POTASSIUM CHLORIDE 10 MEQ: 7.46 INJECTION, SOLUTION INTRAVENOUS at 04:42

## 2018-10-10 RX ADMIN — POTASSIUM BICARBONATE 50 MEQ: 25 TABLET, EFFERVESCENT ORAL at 17:40

## 2018-10-10 RX ADMIN — BUDESONIDE AND FORMOTEROL FUMARATE DIHYDRATE 2 PUFF: 160; 4.5 AEROSOL RESPIRATORY (INHALATION) at 17:48

## 2018-10-10 RX ADMIN — ASPIRIN 81 MG: 81 TABLET, COATED ORAL at 05:09

## 2018-10-10 RX ADMIN — TAMSULOSIN HYDROCHLORIDE 0.4 MG: 0.4 CAPSULE ORAL at 17:41

## 2018-10-10 RX ADMIN — FUROSEMIDE 80 MG: 10 INJECTION, SOLUTION INTRAMUSCULAR; INTRAVENOUS at 05:25

## 2018-10-10 RX ADMIN — POTASSIUM BICARBONATE 50 MEQ: 25 TABLET, EFFERVESCENT ORAL at 07:59

## 2018-10-10 RX ADMIN — POTASSIUM BICARBONATE 50 MEQ: 25 TABLET, EFFERVESCENT ORAL at 15:10

## 2018-10-10 RX ADMIN — POTASSIUM CHLORIDE 40 MEQ: 1500 TABLET, EXTENDED RELEASE ORAL at 04:42

## 2018-10-10 RX ADMIN — POTASSIUM CHLORIDE 10 MEQ: 7.46 INJECTION, SOLUTION INTRAVENOUS at 06:42

## 2018-10-10 RX ADMIN — ALLOPURINOL 100 MG: 100 TABLET ORAL at 05:09

## 2018-10-10 RX ADMIN — MAGNESIUM GLUCONATE 500 MG ORAL TABLET 400 MG: 500 TABLET ORAL at 18:00

## 2018-10-10 RX ADMIN — FINASTERIDE 5 MG: 5 TABLET, FILM COATED ORAL at 05:09

## 2018-10-10 RX ADMIN — MONTELUKAST SODIUM 10 MG: 10 TABLET, FILM COATED ORAL at 17:41

## 2018-10-10 ASSESSMENT — ENCOUNTER SYMPTOMS
ABDOMINAL PAIN: 0
EYES NEGATIVE: 1
PALPITATIONS: 0
PND: 0
VOMITING: 0
BLOOD IN STOOL: 0
CHEST TIGHTNESS: 0
ORTHOPNEA: 0
CLAUDICATION: 0
CHILLS: 0
SHORTNESS OF BREATH: 1
MUSCULOSKELETAL NEGATIVE: 1
COUGH: 0
CONSTITUTIONAL NEGATIVE: 1
RESPIRATORY NEGATIVE: 1
DIZZINESS: 0
FEVER: 0
DOUBLE VISION: 0
GASTROINTESTINAL NEGATIVE: 1
NAUSEA: 0
BLURRED VISION: 0
SHORTNESS OF BREATH: 0

## 2018-10-10 ASSESSMENT — PATIENT HEALTH QUESTIONNAIRE - PHQ9
SUM OF ALL RESPONSES TO PHQ9 QUESTIONS 1 AND 2: 0
2. FEELING DOWN, DEPRESSED, IRRITABLE, OR HOPELESS: NOT AT ALL
1. LITTLE INTEREST OR PLEASURE IN DOING THINGS: NOT AT ALL

## 2018-10-10 ASSESSMENT — PAIN SCALES - GENERAL
PAINLEVEL_OUTOF10: 1
PAINLEVEL_OUTOF10: 0
PAINLEVEL_OUTOF10: 0
PAINLEVEL_OUTOF10: 8
PAINLEVEL_OUTOF10: 0

## 2018-10-10 NOTE — PROGRESS NOTES
"       Internal Medicine Interval Note  Note Author: Glen Ratliff M.D.     Name Thuy Guidry       1935   Age/Sex 82 y.o. male   MRN [id number]   Code Status DNAR/DNI     After 5PM or if no immediate response to page, please call for cross-coverage  Attending/Team: Dr. Wilkins See Patient List for primary contact information  Call [phone] to page    1st Call - Day Intern (R1):   Dr. Ratliff 2nd Call - Day Sr. Resident (R2/R3):   Dr. Corado         Reason for interval visit  (Principal Problem)   Decompensated heart failure, now with reduced ejection fraction      Interval Problem Daily Status Update  (24 hours, problem oriented, brief subjective history, new lab/imaging data pertinent to that problem)   24H: Patient's potassium decreased to 2.7 overnight, received oral and IV repletion. Doing well this morning, states he has been ambulatory and feels much improved    Acute-on-chronic heart failure - Significantly improved JVD, edema, orthopnea, dyspnea on exertion. Patient states he feels \"back to normal.\" Echocardiogram performed this morning, per cardiology now has reduced EF of 30-40% and severe aortic stenosis now with perivalvular leak. Per cardiology should follow for possible surgical intervention in [place]    Hypokalemia - Received furosemide, acutely decreased potassium to 2.7 now improved to 3.4.     Leukocytosis - improving, no signs or symptoms of sepsis    HTN - stable  Review of Systems   Constitutional: Negative for chills and fever.   Eyes: Negative for blurred vision and double vision.   Respiratory: Negative for cough and shortness of breath.    Cardiovascular: Negative for chest pain, orthopnea, leg swelling and PND.   Gastrointestinal: Negative for nausea and vomiting.   Genitourinary: Negative for dysuria, frequency, hematuria and urgency.       Disposition/Barriers to discharge:   Likely to discharge this afternoon pending normal potassium    Consultants/Specialty  Dr." Syed, Cardiology  Dr. Cantu, general surgery  PCP: Pcp Not In Computer      Quality Measures  Quality-Core Measures   Reviewed items::  Labs reviewed and Medications reviewed  Bull catheter::  No Bull  DVT prophylaxis pharmacological::  Enoxaparin (Lovenox)          Physical Exam       Vitals:    10/10/18 0002 10/10/18 0405 10/10/18 0800 10/10/18 1202   BP: 105/55 109/53 111/51 116/54   Pulse: 91 79 78 76   Resp: 18 18 18 18   Temp: 36.4 °C (97.5 °F) 36.6 °C (97.8 °F) 36.4 °C (97.6 °F) 36.7 °C (98 °F)   SpO2: 91% 92% 95% 94%   Weight:       Height:         Body mass index is 32.06 kg/m². Weight: 90.1 kg (198 lb 10.2 oz)  Oxygen Therapy:  Pulse Oximetry: 94 %, O2 (LPM): 0, O2 Delivery: None (Room Air)    Physical Exam   Constitutional: He is oriented to person, place, and time.   Cardiovascular: Normal rate and regular rhythm.    5/6 harsh systolic murmur radiating to carotids  No JVD visible, jugular pulsations decreased  Now only 1+ peripheral edema   Pulmonary/Chest:   Lungs clear to auscultation bilaterally, no crackles or wheezes   Abdominal:   Non-tender, non-distended  +distended veins  +telangiectasias   Musculoskeletal: He exhibits edema (1+ improved).   Neurological: He is alert and oriented to person, place, and time.             Assessment/Plan     Acute on chronic right-sided congestive heart failure (HCC)- (present on admission)   Assessment & Plan    Assessment: Patient received 80mg IV lasix twice and as of this morning has improved significantly. Echocardiogram was performed and per cardiology demonstrated new reduced ejection fraction, likely secondary to worsening valvular disease. He will likely need surgical intervention, however this is not necessary immediately and can be done as an outpatient with his cardiologist in [place]. He has severe aortic stenosis causing his murmur as well as a olivia-valvular leak. Medical management with ACE/ARB, spironolactone, and beta blocker would  normally be indicated, however due to the mechanical etiology of his exacerbated heart failure this likely would not help and given his low-normal BPs might cause significant hypotension.    Transaminases decreasing and creatinine improved indicating that his transaminitis and decreased GFR were likely secondary to heart failure via congestion and decreased forward output respectively.     Plan:  Started Torsemide 100mg daily per cardiology recommendations; this is better absorbed with edematous bowels, outpatient cardiologist to titrate down as needed  Restart home potassium  Continue ASA  DC Metolazone per cardiology  Discharge to home pending normalization of hypokalemia  Strict I/O  Daily weights  Low-sodium diet        Asthma   Assessment & Plan    Patient endorses history of asthma, says it has been improving and that he hardly ever uses his inhalers anymore. Continuing to offer home inhalers PRN. Imaging does demonstrate centrilobular emphysema and patient has a (remote) significant smoking history indicating likely underlying COPD.        Leukocytosis- (present on admission)   Assessment & Plan    Assessment: Decreasing, no signs/symptoms of sepsis    Plan:  Follow outpatient        Elevated LFTs- (present on admission)   Assessment & Plan    Assessment: Improving after diuresis indicating that was likely secondary to vascular congestion.    Plan  Follow outpatient        Hyponatremia   Assessment & Plan    Resolved with diuresis        Gout   Assessment & Plan    Continue home allopurinol        Hypertension   Assessment & Plan    Currently not hypertensive        Benign prostatic hyperplasia   Assessment & Plan    Finasteride and tamsulosin substituted for home medications        Hyperlipidemia   Assessment & Plan    Continue simvastatin

## 2018-10-10 NOTE — ASSESSMENT & PLAN NOTE
Assessment: likely reactive trending down, no signs/symptoms of sepsis- CT chest- negative, UTI negative     Plan:  Follow outpatient

## 2018-10-10 NOTE — DISCHARGE SUMMARY
"        Internal Medicine Discharge Summary  Note Author: Kriss Corado M.D.       Name Thuy Guidry       1935   Age/Sex 82 y.o. male   MRN [id number]         Admit Date:  10/9/2018       Discharge Date:   10/11/18    Service:   [facility]  Attending Physician(s):   Dr Wilkins      Senior Resident(s):   Dr Corado  Pablo Resident(s):   Dr Ratliff  PCP: Pcp Not In Computer      Primary Diagnosis:   Acute decompensated heart failure, HFrEF,   Severe  Bioprosthetic MS  and severe Bioprosthetic  AS    Secondary Diagnoses:                Active Problems:    Acute on chronic right-sided congestive heart failure (HCC) POA: Yes    Hypokalemia POA: Unknown    Elevated LFTs POA: Yes    Leukocytosis POA: Yes    Asthma POA: Unknown    Hyperlipidemia POA: Unknown    Benign prostatic hyperplasia POA: Unknown    Hypertension POA: Unknown    Gout POA: Unknown    Hyponatremia POA: Unknown  Resolved Problems:    * No resolved hospital problems. *      Hospital Summary (Brief Narrative):      Mr. Guidry is a 82 y.o. male with PMHx of HFpEF, severe MS and AS S/P valve repair, HTN, COPD who was admitted for the  worsening of LY and SOB 2/2 ADCHF. Pt was recently prescribed higher dose of LASIX, but he continued to take lower dose of lasix and \"gained 10 lbs in 2 days\" with SOB.   He was aggressively diuresed with IV lasix 180 mg and his weight went down from 208 to 194lbs, he was switched to torsemide 100 mg. He had hypokalemia 2/2 aggressive diuresis, his was aggressively repleted with potassium supplement.   Upon discharge his K was stable.   Pt felt significantly better in his breathing and his general health after the diuresis.  Pt had ECHO which revealed worsened EF of 35% from 60-66% in 2018. Severe MS and AS bioprosthetic. Which is likely the cause of his worsening cardiac symptoms.  Cardiology was consulted and recommended valvular surgery for definitive treatment. D/w pt and Pt " wants to resume care with his cardiologist at [place] , where he lives. Cardiology is trying to contact primary cardiologist for continuity of care.   He was discharged on toresemide 80 mg, with K and mag ox supplement.   With his K in range he was deemed safe to d/c with close follow up with his primary care and cardiologist and BMP in 3 days.     As his K was in high normal range pt was suggested to not take K supplement today and resume from tomorrow. Also, daily weight and titrate his toresamide accordingly to keep goal weight below 190 .           Patient /Hospital Summary (Details -- Problem Oriented) :          Hypokalemia   Assessment & Plan    2/2 loop diuretics   - replete aggressively, recheck K after 100 meq supplementation  - QTc was prolonged on EKG yesterday 503, today's 449  - supplement magnesium         Acute on chronic right-sided congestive heart failure (HCC)   Assessment & Plan    Assessment: Patient received 80mg IV lasix twice and as of this morning has improved significantly. Echocardiogram was performed and per cardiology demonstrated new reduced ejection fraction, likely secondary to worsening valvular disease. He will likely need surgical intervention, however this is not necessary immediately and can be done as an outpatient with his cardiologist in [place]. He has severe aortic stenosis causing his murmur as well as a olivia-valvular leak. Medical management with ACE/ARB, spironolactone, and beta blocker would normally be indicated, however due to the mechanical etiology of his exacerbated heart failure this likely would not help and given his low-normal BPs might cause significant hypotension.    Transaminases decreasing and creatinine improved indicating that his transaminitis and decreased GFR were likely secondary to heart failure via congestion and decreased forward output respectively.     Plan:  Switched from home PO lasix to Torsemide 100mg daily per cardiology  recommendations- better bioavailability. F/u with OP cards   Pt was educated to maintain his weight- under 190 lbs   - differ ACE/ARB, BB for now, can f/u OP for the starting it when stable  Restart home potassium  Continue ASA  Daily weights  Low-sodium diet        Asthma   Assessment & Plan    Patient endorses history of asthma, says it has been improving and that he hardly ever uses his inhalers anymore. Continuing to offer home inhalers PRN. Imaging does demonstrate centrilobular emphysema and patient has a (remote) significant smoking history indicating likely underlying COPD.        Leukocytosis   Assessment & Plan    Assessment: likely reactive trending down, no signs/symptoms of sepsis- CT chest- negative, UTI negative     Plan:  Follow outpatient        Elevated LFTs   Assessment & Plan    Assessment: Improving after diuresis indicating that was likely secondary to vascular congestion.    Plan  Follow outpatient        Hyponatremia   Assessment & Plan    Resolved with diuresis        Gout   Assessment & Plan    Continue home allopurinol        Hypertension   Assessment & Plan    Currently not hypertensive        Benign prostatic hyperplasia   Assessment & Plan    Finasteride and tamsulosin substituted for home medications        Hyperlipidemia   Assessment & Plan    Continue simvastatin            Consultants:     Cardiology- Dr louise     Procedures:        None     Imaging/ Testing:      ECHO Moderately reduced left ventricular systolic function.  Left ventricular ejection fraction is visually estimated to be 35-40%.  Mild concentric left ventricular hypertrophy.  Severely dilated left atrium.  Severely dilated right ventricle.  Reduced right ventricular systolic function.  Enlarged right atrium.  Severe stenosis of the bioprosthetic mitral valve.   Mean transvalvular gradient is  17 mmHg at a heart rate of 86 BPM.  Mild mitral regurgitation.  Severe aortic stenosis of the bioprosthetic valve.  Moderate  to severe eccentric aortic insufficiency, suspected   perivalvular leak is observed.  Mild tricuspid regurgitation.  Right ventricular systolic pressure is estimated to be 45 mmHg.    Discharge Medications:         Medication Reconciliation: Completed       Medication List      START taking these medications      Instructions   magnesium oxide 400 (241.3 Mg) MG Tabs tablet  Commonly known as:  MAG-OX   Take 1 Tab by mouth 2 times a day.  Dose:  400 mg     torsemide 20 MG Tabs  Commonly known as:  DEMADEX   Take 4 Tabs by mouth every day.  Dose:  80 mg        CONTINUE taking these medications      Instructions   albuterol 108 (90 Base) MCG/ACT Aers inhalation aerosol   Inhale 2 Puffs by mouth every 6 hours as needed for Shortness of Breath.  Dose:  2 Puff     allopurinol 100 MG Tabs  Commonly known as:  ZYLOPRIM   TAKE 1 TABLET EVERY MORNING FOR GOUT PREVENTION     aspirin EC 81 MG Tbec  Commonly known as:  ECOTRIN   Take 81 mg by mouth every day.  Dose:  81 mg     dutasteride 0.5 MG capsule  Commonly known as:  AVODART   TAKE ONE CAPSULE EVERY AFTERNOON FOR ENLARGED PROSTATE     fluticasone-salmeterol 250-50 MCG/DOSE Aepb  Commonly known as:  ADVAIR   Inhale 1 Puff by mouth every 12 hours.  Dose:  1 Puff     montelukast 10 MG Tabs  Commonly known as:  SINGULAIR   Take 10 mg by mouth every day.  Dose:  10 mg     pantoprazole 40 MG Tbec  Commonly known as:  PROTONIX   Take 40 mg by mouth every day.  Dose:  40 mg     potassium chloride SA 20 MEQ Tbcr  Commonly known as:  Kdur   Take 20-40 mEq by mouth 4 times a day. 40 meq AM 40 meq afternoon 40 meq evening 20 meq middle of night  Dose:  20-40 mEq     PRESERVISION AREDS 2 PO   Take 1 Tab by mouth 2 Times a Day.  Dose:  1 Tab     RAPAFLO 8 MG Caps capsule  Generic drug:  silodosin   TAKE ONE CAPSULE EVERY AFTERNOON FOR ENLARGED PROSTATE     simvastatin 40 MG Tabs  Commonly known as:  ZOCOR   Take 40 mg by mouth every evening.  Dose:  40 mg     tramadol 50 MG  Tabs  Commonly known as:  ULTRAM   Take 50 mg by mouth every 8 hours as needed.  Dose:  50 mg     zolpidem 5 MG Tabs  Commonly known as:  AMBIEN   Take 10 mg by mouth at bedtime as needed for Sleep.  Dose:  10 mg        STOP taking these medications    furosemide 40 MG Tabs  Commonly known as:  LASIX     metOLazone 5 MG Tabs  Commonly known as:  ZAROXOLYN            Can use .DISCHARGEMEDSLIST if going to another facility         Disposition:   Home     Diet:   Low salt diet    Activity:   As tolerated    Instructions:        The patient was instructed to return to the ER in the event of worsening symptoms. I have counseled the patient on the importance of compliance and the patient has agreed to proceed with all medical recommendations and follow up plan indicated above.   The patient understands that all medications come with benefits and risks. Risks may include permanent injury or death and these risks can be minimized with close reassessment and monitoring.        Primary Care Provider:     Discharge summary faxed to primary care provider:  Completed  Copy of discharge summary given to the patient: Completed      Follow up appointment details :      F/u with PCP and cardiologist in [facility]  Currently scheduled HF f/u appointment:  DR FRENCH   On 10/15/2018   Please check in at 415pm for your appointment thank you    [address], [place] [id number] [phone]   Jeison Knox MD, [facility]   On 10/17/2018   Please check in at 945am for your appointment thank you    [address], [place] [address]  [phone]         Pending Studies:        None     Time spent on discharge day patient visit, preparing discharge paperwork and arranging for patient follow up.    Summary of follow up issues:   - f/u BMP for potassium and Creatinine   - F/u on heart failure  - f/u on valvular disease, surgical options    Discharge Time (Minutes) :    55 mins  Hospital Course Type:   Inpatient Stay >2 midnights      Condition on Discharge  Stable   ______________________________________________________________________    Interval history/exam for day of discharge:    Pt is feeling better, breathing is back to baseline   General; sitting on the bed, looks comfortable, no acute distress  HEENT: AT/NC, PERRLA, EOMI, neck supple, MMM  CHest: CTAB  CVS: RRR, S1S2, 5/6 harsh systolic murmur radiating to carotids. JVP +, + pedal edema 1+ on ankles   Abdomen: soft non tender  Neuro: a&O x 3, no FD  Extremity:+ pedal edema 1+ on ankles   Psych: mood and affect appropriate       Most Recent Labs:    Lab Results   Component Value Date/Time    WBC 17.3 (H) 10/11/2018 05:45 AM    RBC 4.42 (L) 10/11/2018 05:45 AM    HEMOGLOBIN 13.2 (L) 10/11/2018 05:45 AM    HEMATOCRIT 38.3 (L) 10/11/2018 05:45 AM    MCV 86.7 10/11/2018 05:45 AM    MCH 29.9 10/11/2018 05:45 AM    MCHC 34.5 10/11/2018 05:45 AM    MPV 14.2 (H) 10/11/2018 05:45 AM    NEUTSPOLYS 76.00 (H) 10/11/2018 05:45 AM    LYMPHOCYTES 9.40 (L) 10/11/2018 05:45 AM    MONOCYTES 10.90 10/11/2018 05:45 AM    EOSINOPHILS 1.00 10/11/2018 05:45 AM    BASOPHILS 0.20 10/11/2018 05:45 AM      Lab Results   Component Value Date/Time    SODIUM 135 10/11/2018 05:45 AM    POTASSIUM 3.1 (L) 10/11/2018 05:45 AM    CHLORIDE 96 10/11/2018 05:45 AM    CO2 28 10/11/2018 05:45 AM    GLUCOSE 116 (H) 10/11/2018 05:45 AM    BUN 36 (H) 10/11/2018 05:45 AM    CREATININE 1.02 10/11/2018 05:45 AM      Lab Results   Component Value Date/Time    ALTSGPT 239 (H) 10/11/2018 05:45 AM    ASTSGOT 47 (H) 10/11/2018 05:45 AM    ALKPHOSPHAT 215 (H) 10/11/2018 05:45 AM    TBILIRUBIN 1.7 (H) 10/11/2018 05:45 AM    LIPASE 183 08/21/2015 09:55 PM    ALBUMIN 3.6 10/11/2018 05:45 AM    GLOBULIN 2.8 10/11/2018 05:45 AM    INR 0.97 08/21/2015 09:55 PM     Lab Results   Component Value Date/Time    PROTHROMBTM 10.0 08/21/2015 09:55 PM    INR 0.97 08/21/2015 09:55 PM

## 2018-10-10 NOTE — CONSULTS
Consults     Admission Date / Service Date: 10/09/18    Patient's PCP: Pcp Not In Computer    Consult requested by: Dr. Jorge Wilkins    CC: Heart failure      HPI:  Mr. Guidry is a pleasant 82-year-old gentleman who was received prior cardiac care elsewhere.  He has known diastolic dysfunction and hypertensive heart disease.  He is known to have mitral regurgitation.  Records were obtained from his primary cardiologist.  He was transferred from [facility].  Patient lives in [place] and was visiting [facility] and arrived about 5 days ago at a 2-week stay.  He believes he is gained 10 pounds over the last 48 hours. He presented with worsening shortness of breath.  He has actually not been feeling well for many days.  His weight was up prior to leaving so his Lasix and metolazone were increased.  He has orthopnea and has not been able to sleep.  He has had worsening lower extremity edema.    His LFTs were abnormal and so his gallbladder was evaluated.  HIDA scan is normal.  A right upper quadrant ultrasound is pending.  He also has a leukocytosis of almost 24,000.    He is accompanied by his wife.    He is able to tell me that he was seeing a doctor who is giving him Fen/Phen and B notes to him and he ended up with valve disease.  In 2013 he had aortic valve and mitral valve replacement with bioprosthetic valves.  He did not need bypass at the time.  He has been treated for heart failure and was told that his valve was worsening.  On physical exam he has a very abnormal sounding valve.  His blood pressure has been normal.  He has significant JVD on exam.    No family history of CHF    Data reviewed by me personally:  Current medications  Tele-sinus rhythm  ECG 10/9/2018 sinus, rate 79, atypical left bundle branch block, diffuse repolarization abnormality  Echo pending  Nuclear gallbladder study with normal    Medications / Drug list prior to admission:  No current facility-administered  medications on file prior to encounter.      Current Outpatient Prescriptions on File Prior to Encounter   Medication Sig Dispense Refill   • allopurinol (ZYLOPRIM) 100 MG Tab TAKE 1 TABLET EVERY MORNING FOR GOUT PREVENTION     • dutasteride (AVODART) 0.5 MG capsule TAKE ONE CAPSULE EVERY AFTERNOON FOR ENLARGED PROSTATE     • potassium chloride SA (KDUR) 20 MEQ Tab CR Take 20-40 mEq by mouth 4 times a day. 40 meq AM  40 meq afternoon  40 meq evening  20 meq middle of night     • silodosin (RAPAFLO) 8 MG Cap capsule TAKE ONE CAPSULE EVERY AFTERNOON FOR ENLARGED PROSTATE     • simvastatin (ZOCOR) 40 MG Tab Take 40 mg by mouth every evening.     • montelukast (SINGULAIR) 10 MG Tab Take 10 mg by mouth every day.     • zolpidem (AMBIEN) 5 MG Tab Take 10 mg by mouth at bedtime as needed for Sleep.     • aspirin EC (ECOTRIN) 81 MG Tablet Delayed Response Take 81 mg by mouth every day.         Current list of administered Medications:    Current Facility-Administered Medications:   •  senna-docusate (PERICOLACE or SENOKOT S) 8.6-50 MG per tablet 2 Tab, 2 Tab, Oral, BID **AND** polyethylene glycol/lytes (MIRALAX) PACKET 1 Packet, 1 Packet, Oral, QDAY PRN **AND** magnesium hydroxide (MILK OF MAGNESIA) suspension 30 mL, 30 mL, Oral, QDAY PRN **AND** bisacodyl (DULCOLAX) suppository 10 mg, 10 mg, Rectal, QDAY PRN, Glen Ratliff M.D.  •  Respiratory Care per Protocol, , Nebulization, Continuous RT, Glen Ratliff M.D.  •  enoxaparin (LOVENOX) inj 40 mg, 40 mg, Subcutaneous, DAILY, Glen Ratliff M.D., 40 mg at 10/09/18 1618  •  albuterol inhaler 2 Puff, 2 Puff, Inhalation, Q6HRS PRN, Jorge Wilkins M.D.  •  [START ON 10/10/2018] allopurinol (ZYLOPRIM) tablet 100 mg, 100 mg, Oral, DAILY, Jorge Wilkins M.D.  •  [START ON 10/10/2018] aspirin EC (ECOTRIN) tablet 81 mg, 81 mg, Oral, DAILY, Jorge Wilkins M.D.  •  [START ON 10/10/2018] finasteride (PROSCAR) tablet 5 mg, 5 mg, Oral, DAILY, Jorge Wilkins M.D.  •   budesonide-formoterol (SYMBICORT) 160-4.5 MCG/ACT inhaler 2 Puff, 2 Puff, Inhalation, BID (RT), Jorge Wilkins M.D.  •  montelukast (SINGULAIR) tablet 10 mg, 10 mg, Oral, DAILY, Jorge Wilkins M.D., 10 mg at 10/09/18 1705  •  tamsulosin (FLOMAX) capsule 0.4 mg, 0.4 mg, Oral, AFTER DINNER, Jorge Wilkins M.D., 0.4 mg at 10/09/18 1705  •  simvastatin (ZOCOR) tablet 40 mg, 40 mg, Oral, Q EVENING, Jorge Wilkins M.D., 40 mg at 10/09/18 1705  •  zolpidem (AMBIEN) tablet 5 mg, 5 mg, Oral, HS PRN, Jorge Wilkins M.D.  •  potassium bicarbonate (KLYTE) effervescent tablet 50 mEq, 50 mEq, Oral, BID, Glen Ratliff M.D., 50 mEq at 10/09/18 1705  •  [START ON 10/10/2018] metOLazone (ZAROXOLYN) tablet 2.5 mg, 2.5 mg, Oral, DAILY, Glen Ratliff M.D.  •  Influenza Vaccine High-Dose pf injection 0.5 mL, 0.5 mL, Intramuscular, Once, Jorge Wilkins M.D.  •  pneumococcal 13-Guadalupe Conj Vacc (PREVNAR 13) syringe 0.5 mL, 0.5 mL, Intramuscular, Once, Jorge Wilkins M.D.    Past Medical History:   Diagnosis Date   • Congestive heart failure (HCC)    • High cholesterol    • Hypertension        Past Surgical History:   Procedure Laterality Date   • OTHER      'open heart surgery'   • OTHER ABDOMINAL SURGERY      3x hernia repair       Family History   Problem Relation Age of Onset   • No Known Problems Mother         Lived to >100 years old   • Cancer Father          of unknown cancer @ 32 years old       Social History     Social History   • Marital status:      Spouse name: N/A   • Number of children: N/A   • Years of education: N/A     Occupational History   • Not on file.     Social History Main Topics   • Smoking status: Former Smoker   • Smokeless tobacco: Never Used   • Alcohol use Yes      Comment: 2-3 drinks nightly   • Drug use: No   • Sexual activity: Not on file     Other Topics Concern   • Not on file     Social History Narrative   • No narrative on file       No Known Allergies    Review of systems:  All others  "systems reviewed and negative.    Physical exam:  Patient Vitals for the past 24 hrs:   BP Temp Pulse Resp SpO2 Height Weight   10/09/18 1600 - - 83 18 94 % - -   10/09/18 1551 105/57 36.1 °C (97 °F) 76 20 92 % - -   10/09/18 1200 - - 78 16 96 % - -   10/09/18 1033 122/58 36.6 °C (97.8 °F) 79 18 96 % 1.676 m (5' 6\") 94.3 kg (208 lb)       General: No acute distress. Well nourished.  HEENT: EOM grossly intact, no scleral icterus, no pharyngeal erythema.   Neck:  JVD to the mandible at 45 degrees, no bruits, trachea midline  CVS: RRR. Normal S1, S2.  Harsh, high-pitched systolic murmur throughout the chest and radiating to carotids.  2-3+ LE edema, pitting at the ankles, nonpitting at the tibia.  2+ radial pulses, 1+ PT pulses  Resp: Moderate increased work of breathing at rest, coarse bibasilar rales, no wheezing  Abdomen: Soft, NT, + hepatomegaly, obese.  MSK/Ext: No clubbing or cyanosis.  Skin: Warm and dry, no rashes.  Neurological: CN III-XII grossly intact. No focal deficits.   Psych: A&O x 3, appropriate affect, good judgement    Data:  Laboratory studies:  Lab Results   Component Value Date/Time    WBC 23.7 (H) 10/09/2018 05:40 AM    RBC 4.50 (L) 10/09/2018 05:40 AM    HEMOGLOBIN 13.1 (L) 10/09/2018 05:40 AM    HEMATOCRIT 38.7 (L) 10/09/2018 05:40 AM    MCV 86.0 10/09/2018 05:40 AM    MCH 29.1 10/09/2018 05:40 AM    MCHC 33.9 10/09/2018 05:40 AM    MPV 14.3 (H) 10/09/2018 05:40 AM    NEUTSPOLYS 80 (H) 10/09/2018 05:40 AM    LYMPHOCYTES 11 (L) 10/09/2018 05:40 AM    MONOCYTES 6 10/09/2018 05:40 AM        Lab Results   Component Value Date/Time    SODIUM 131 (L) 10/09/2018 05:40 AM    POTASSIUM 4.2 10/09/2018 05:40 AM    CHLORIDE 99 10/09/2018 05:40 AM    CO2 19 (L) 10/09/2018 05:40 AM    GLUCOSE 121 (H) 10/09/2018 05:40 AM    BUN 38 (H) 10/09/2018 05:40 AM    CREATININE 1.2 10/09/2018 05:40 AM        Recent Labs      10/09/18   0540   ASTSGOT  195*   ALTSGPT  550*   TBILIRUBIN  0.7   ALKPHOSPHAT  305*       Lab " Results   Component Value Date/Time    PROTHROMBTM 10.0 08/21/2015 09:55 PM    INR 0.97 08/21/2015 09:55 PM        Imaging:  NM-HEPATOBILIARY SCAN   Final Result      No scintigraphic evidence of cholecystitis or biliary obstruction      OUTSIDE IMAGES-CT CHEST   Final Result      EC-ECHOCARDIOGRAM COMPLETE W/O CONT    (Results Pending)       Active Problems:    Acute on chronic right-sided congestive heart failure (HCC) POA: Yes    Cholecystitis POA: No    Cholelithiasis POA: Yes    Elevated LFTs POA: Yes  Resolved Problems:    * No resolved hospital problems. *      Assessment / Plan:  1.  Acute on chronic heart failure, likely preserved ejection fraction based on prior reports, may be related to severe valve disease  2.  Prior bioprosthetic aortic valve and mitral valve with very abnormal physical exam finding  3.  Abnormal LFTs, most consistent with RV heart failure  4.  Hypertension  5.  Chronic kidney disease stage III  6.  Mild hyponatremia, likely hypervolemic  7. Mildly abnormal troponin in the setting of heart failure,  I do not think this is ACS    -IV Lasix tonight  -Blood pressure control  -Echocardiogram in the morning, n.p.o. after midnight for possible KETTY  -DW Dr. Kriss Corado  -Dr. Katelyn Woody made aware and she is the on-call cardiologist for tonight    It is my pleasure to participate in the care of Mr. Guidry.  Please do not hesitate to contact me with questions or concerns.    Lacey Lynch MD, [facility]  Cardiologist [facility]    Please note that this dictation was created using voice recognition software. I have made every reasonable attempt to correct obvious errors, but it is possible there are errors of grammar and possibly content that I did not discover before finalizing the note.

## 2018-10-10 NOTE — PROGRESS NOTES
Assumed care of patient at this time. States breathing improved, LY much improved. Denies any CP or SOB at this time. No further needs.    0330- Call from lab with urgent lab value, potassium 2.7. Page to [facility] blue.  0345- Call back from resident, to review chart and place appropriate orders.

## 2018-10-10 NOTE — ASSESSMENT & PLAN NOTE
Assessment: Patient received 80mg IV lasix twice and as of this morning has improved significantly. Echocardiogram was performed and per cardiology demonstrated new reduced ejection fraction, likely secondary to worsening valvular disease. He will likely need surgical intervention, however this is not necessary immediately and can be done as an outpatient with his cardiologist in [place]. He has severe aortic stenosis causing his murmur as well as a olivia-valvular leak. Medical management with ACE/ARB, spironolactone, and beta blocker would normally be indicated, however due to the mechanical etiology of his exacerbated heart failure this likely would not help and given his low-normal BPs might cause significant hypotension.    Transaminases decreasing and creatinine improved indicating that his transaminitis and decreased GFR were likely secondary to heart failure via congestion and decreased forward output respectively.     Plan:  Switched from home PO lasix to Torsemide 100mg daily per cardiology recommendations- better bioavailability. F/u with OP cards   Pt was educated to maintain his weight- under 190 lbs   - differ ACE/ARB, BB for now, can f/u OP for the starting it when stable  Restart home potassium  Continue ASA  Daily weights  Low-sodium diet

## 2018-10-10 NOTE — ASSESSMENT & PLAN NOTE
Assessment: Improving after diuresis indicating that was likely secondary to vascular congestion.    Plan  Follow outpatient

## 2018-10-10 NOTE — PROGRESS NOTES
Cardiology Follow Up Progress Note    Date of Service  10/10/2018    Attending Physician  Jorge Wilkins M.D.    Chief Complaint   SOB    Consulted for heart failure.    HPI  Thuy Guidry is a very pleasant 82 y.o. male transferred from [facility] on 10/9/2018 with complaints of shortness of breath.  Patient lives in [place] and was visiting [facility].    Past medical history significant for diastolic dysfunction, HFpEF, hypertension, COPD, dyslipidemia and aortic and mitral valve stenosis as/P valve replacement.    Interim Events    10/10/18: Resting on edge of bed with wife at bedside. States that his SOB and lower extremity edema has improved. Denies chest pain. No significant events overnight. Patient anxious to go home.     Monitor: SR/ST  with frequent PVC's and bigeminy with rare couplets.    Labs:  WBC 20.2    K 2.7, Bun 32, Cr 0.93, GFR >60, AST 85, , Alk Phos 206 patient    Review of Systems  Review of Systems   Constitutional: Negative for fever.   Respiratory: Positive for shortness of breath. Negative for chest tightness.    Cardiovascular: Positive for leg swelling. Negative for chest pain and palpitations.   Gastrointestinal: Negative for abdominal pain and blood in stool.   Genitourinary: Negative for hematuria.   Musculoskeletal: Negative for gait problem.   Neurological: Negative for dizziness and syncope.   All other systems reviewed and are negative.      Vital signs in last 24 hours  Temp:  [36.1 °C (97 °F)-36.6 °C (97.9 °F)] 36.6 °C (97.8 °F)  Pulse:  [76-92] 79  Resp:  [16-20] 18  BP: (105-122)/(53-58) 109/53    Physical Exam  Physical Exam   Constitutional: He is oriented to person, place, and time. He appears well-developed and well-nourished.   HENT:   Head: Normocephalic.   Eyes: EOM are normal.   Neck: No JVD present.   Cardiovascular: Normal rate, regular rhythm and intact distal pulses.    Murmur heard.   Systolic murmur is present with a grade of 5/6    Pulses:       Radial pulses are 2+ on the right side, and 2+ on the left side.        Dorsalis pedis pulses are 2+ on the right side, and 2+ on the left side.   Pulmonary/Chest: Effort normal. He has rales in the right lower field and the left lower field.   Abdominal: Soft. There is no tenderness.   Musculoskeletal: Normal range of motion. He exhibits edema.   Neurological: He is alert and oriented to person, place, and time.   Skin: Skin is warm and dry.   Psychiatric: He has a normal mood and affect. His behavior is normal. Thought content normal.   Nursing note and vitals reviewed.      Lab Review  Lab Results   Component Value Date/Time    WBC 20.2 (H) 10/10/2018 02:22 AM    RBC 4.24 (L) 10/10/2018 02:22 AM    HEMOGLOBIN 12.5 (L) 10/10/2018 02:22 AM    HEMATOCRIT 36.7 (L) 10/10/2018 02:22 AM    MCV 86.6 10/10/2018 02:22 AM    MCH 29.5 10/10/2018 02:22 AM    MCHC 34.1 10/10/2018 02:22 AM    MPV 14.5 (H) 10/10/2018 02:22 AM      Lab Results   Component Value Date/Time    SODIUM 137 10/10/2018 02:22 AM    POTASSIUM 2.7 (LL) 10/10/2018 02:22 AM    CHLORIDE 100 10/10/2018 02:22 AM    CO2 25 10/10/2018 02:22 AM    GLUCOSE 95 10/10/2018 02:22 AM    BUN 32 (H) 10/10/2018 02:22 AM    CREATININE 0.93 10/10/2018 02:22 AM      Lab Results   Component Value Date/Time    ASTSGOT 85 (H) 10/10/2018 02:22 AM    ALTSGPT 331 (H) 10/10/2018 02:22 AM     Lab Results   Component Value Date/Time    TROPONINI 1.08 (H) 10/09/2018 04:47 PM       Recent Labs      10/09/18   0540   BNPBTYPENAT  [id number]       Cardiac Imaging and Procedures Review  Echocardiogram: Results pending     Imaging  CT chest 10/9/18:  Impression:  There is a small amount of fluid loculated within the right major fissure.  There is evidence of centrilobular emphysema.  Cardiomegaly is present, along with surgical changes involving the mitral and aortic valves.  I do not see evidence of pulmonary embolus.    Assessment/Plan  Acute on chronic heart failure:   -Trace  right pleural effusion noted on CT of chest.  -Net output -1.5 L, On room air.   -Strict I/O's and daily standup weights.  -Echo results pending.   -Lasix changed to torsemide 100 mg qd.   -K improved to 3.4 on re-check this am.   -BMP in am.    Bioprosthetic aortic and mitral valve:  -Abnormal bowel sounds on physical exam.  -Echocardiogram completed this a.m, results are pending.  -NPO for possible KETTY today.  -ASA 81 mg qd.     Hypertension:  -Stable.  -BP averaging 100's/50'ss.  Continue diuretic as above.-    CKD:  -Stable.  -Creatinine 0.93 and GFR >60 this a.m.    Thank you for allowing us to participate in the care of this patient. Please let us know if there are any questions or concerns.       DIANNA Haider.   [facility]  [phone]

## 2018-10-10 NOTE — CARE PLAN
Problem: Respiratory:  Goal: Respiratory status will improve  Outcome: PROGRESSING AS EXPECTED  Tolerating RA at this time, just mild LY.     Problem: Fluid Volume:  Goal: Will maintain balanced intake and output  Outcome: PROGRESSING AS EXPECTED  Diuresing well, continue IV lasix as ordered.

## 2018-10-10 NOTE — PROGRESS NOTES
Trauma / Surgical Daily Progress Note    Date of Service  10/10/2018    Chief Complaint  82 y.o. male admitted 10/9/2018 with Acute decompensated heart failure (HCC)    Interval Events  HIDA scan with filling therefore no cholecystitis  No pain  Decreasing BLE edema  No surgical needs    Review of Systems  Review of Systems   Constitutional: Negative.    HENT: Negative.    Eyes: Negative.    Respiratory: Negative.    Cardiovascular: Positive for leg swelling. Negative for chest pain, palpitations, orthopnea and claudication.   Gastrointestinal: Negative.    Genitourinary: Negative.    Musculoskeletal: Negative.    Skin: Negative.         Vital Signs  Temp:  [36.1 °C (97 °F)-36.7 °C (98 °F)] 36.7 °C (98 °F)  Pulse:  [76-92] 76  Resp:  [18-20] 18  BP: (105-116)/(51-57) 116/54    Physical Exam  Physical Exam   Constitutional: He is oriented to person, place, and time. He appears well-developed and well-nourished.   HENT:   Head: Normocephalic and atraumatic.   Eyes: Pupils are equal, round, and reactive to light. No scleral icterus.   Neck: Normal range of motion. Neck supple. No tracheal deviation present. No thyromegaly present.   Cardiovascular: Normal rate and regular rhythm.    Abdominal: Soft. Bowel sounds are normal. He exhibits no distension. There is no tenderness. There is no rebound.   Genitourinary:   Genitourinary Comments: voiding   Musculoskeletal: Normal range of motion. He exhibits edema. He exhibits no tenderness or deformity.   Neurological: He is alert and oriented to person, place, and time.   Skin: Skin is warm and dry.   Nursing note and vitals reviewed.      Laboratory  Recent Results (from the past 24 hour(s))   TROPONIN    Collection Time: 10/09/18  4:47 PM   Result Value Ref Range    Troponin I 1.08 (H) 0.00 - 0.04 ng/mL   MAGNESIUM    Collection Time: 10/09/18  4:47 PM   Result Value Ref Range    Magnesium 1.7 1.5 - 2.5 mg/dL   PHOSPHORUS    Collection Time: 10/09/18  4:47 PM   Result Value  Ref Range    Phosphorus 3.3 2.5 - 4.5 mg/dL   EKG    Collection Time: 10/09/18  6:23 PM   Result Value Ref Range    Report       [facility]    Test Date:  2018-10-09  Pt Name:    RIMA GARDNER            Department: ER  MRN:        [id number]                      Room:       T811  Gender:     Male                         Technician: VIOLET  :        [id number]                   Requested By:SHELLY GARCIAS  Order #:    [id number]                    Reading MD:    Measurements  Intervals                                Axis  Rate:       95                           P:          0  SD:         98                           QRS:        51  QRSD:       123                          T:          218  QT:         367  QTc:        462    Interpretive Statements  SINUS RHYTHM  SHORT SD INTERVAL  LEFT ATRIAL ENLARGEMENT  LEFT BUNDLE BRANCH BLOCK  Compared to ECG 10/09/2018 10:42:36  Short SD interval now present     EKG    Collection Time: 10/09/18  6:23 PM   Result Value Ref Range    Report       [facility]    Test Date:  2018-10-09  Pt Name:    RIMA GARDNER            Department: 183  MRN:        [id number]                      Room:       T811  Gender:     Male                         Technician: VIOLET  :        [id number]                   Requested By:SHELLY GARCIAS  Order #:    [id number]                    Reading MD: Farida Woody MD    Measurements  Intervals                                Axis  Rate:       95                           P:          0  SD:         98                           QRS:        51  QRSD:       123                          T:          218  QT:         367  QTc:        462    Interpretive Statements  SINUS RHYTHM  LEFT ATRIAL ENLARGEMENT  LEFT BUNDLE BRANCH BLOCK  Compared to ECG 10/09/2018 10:42:36  Short SD interval now present    Electronically Signed On 10-9-2018 22:47:58 PDT by Farida Woody MD     CBC WITH DIFFERENTIAL    Collection Time: 10/10/18  2:22 AM   Result Value  Ref Range    WBC 20.2 (H) 4.8 - 10.8 K/uL    RBC 4.24 (L) 4.70 - 6.10 M/uL    Hemoglobin 12.5 (L) 14.0 - 18.0 g/dL    Hematocrit 36.7 (L) 42.0 - 52.0 %    MCV 86.6 81.4 - 97.8 fL    MCH 29.5 27.0 - 33.0 pg    MCHC 34.1 33.7 - 35.3 g/dL    RDW 49.9 35.9 - 50.0 fL    Platelet Count 185 164 - 446 K/uL    MPV 14.5 (H) 9.0 - 12.9 fL    Neutrophils-Polys 77.20 (H) 44.00 - 72.00 %    Lymphocytes 9.10 (L) 22.00 - 41.00 %    Monocytes 9.60 0.00 - 13.40 %    Eosinophils 0.70 0.00 - 6.90 %    Basophils 0.20 0.00 - 1.80 %    Immature Granulocytes 3.20 (H) 0.00 - 0.90 %    Nucleated RBC 0.10 /100 WBC    Neutrophils (Absolute) 15.61 (H) 1.82 - 7.42 K/uL    Lymphs (Absolute) 1.83 1.00 - 4.80 K/uL    Monos (Absolute) 1.94 (H) 0.00 - 0.85 K/uL    Eos (Absolute) 0.15 0.00 - 0.51 K/uL    Baso (Absolute) 0.04 0.00 - 0.12 K/uL    Immature Granulocytes (abs) 0.65 (H) 0.00 - 0.11 K/uL    NRBC (Absolute) 0.03 K/uL   COMP METABOLIC PANEL    Collection Time: 10/10/18  2:22 AM   Result Value Ref Range    Sodium 137 135 - 145 mmol/L    Potassium 2.7 (LL) 3.6 - 5.5 mmol/L    Chloride 100 96 - 112 mmol/L    Co2 25 20 - 33 mmol/L    Anion Gap 12.0 (H) 0.0 - 11.9    Glucose 95 65 - 99 mg/dL    Bun 32 (H) 8 - 22 mg/dL    Creatinine 0.93 0.50 - 1.40 mg/dL    Calcium 8.5 8.5 - 10.5 mg/dL    AST(SGOT) 85 (H) 12 - 45 U/L    ALT(SGPT) 331 (H) 2 - 50 U/L    Alkaline Phosphatase 206 (H) 30 - 99 U/L    Total Bilirubin 1.0 0.1 - 1.5 mg/dL    Albumin 3.1 (L) 3.2 - 4.9 g/dL    Total Protein 5.5 (L) 6.0 - 8.2 g/dL    Globulin 2.4 1.9 - 3.5 g/dL    A-G Ratio 1.3 g/dL   ESTIMATED GFR    Collection Time: 10/10/18  2:22 AM   Result Value Ref Range    GFR If African American >60 >60 mL/min/1.73 m 2    GFR If Non African American >60 >60 mL/min/1.73 m 2   EKG    Collection Time: 10/10/18  5:05 AM   Result Value Ref Range    Report       [facility]    Test Date:  2018-10-10  Pt Name:    RIMA GARDNER            Department: 183  MRN:        [id number]                       Room:       [facility]  Gender:     Male                         Technician: JENNIFER  :        1935                   Requested By:SARITA SÁNCHEZ  Order #:    [id number]                    Reading MD: Farida Woody MD    Measurements  Intervals                                Axis  Rate:       84                           P:          30  OR:         181                          QRS:        44  QRSD:       126                          T:          257  QT:         425  QTc:        503    Interpretive Statements  SINUS RHYTHM  VENTRICULAR PREMATURE COMPLEX  LEFT ATRIAL ENLARGEMENT  LEFT BUNDLE BRANCH BLOCK  Compared to ECG 10/09/2018 18:23:07  NO SIGNIFICANT CHANGE  Electronically Signed On 10- 5:43:09 PDT by Farida Woody MD     BASIC METABOLIC PANEL    Collection Time: 10/10/18 10:06 AM   Result Value Ref Range    Sodium 138 135 - 145 mmol/L    Potassium 3.4 (L) 3.6 - 5.5 mmol/L    Chloride 98 96 - 112 mmol/L    Co2 29 20 - 33 mmol/L    Glucose 104 (H) 65 - 99 mg/dL    Bun 30 (H) 8 - 22 mg/dL    Creatinine 1.03 0.50 - 1.40 mg/dL    Calcium 8.9 8.5 - 10.5 mg/dL    Anion Gap 11.0 0.0 - 11.9   ESTIMATED GFR    Collection Time: 10/10/18 10:06 AM   Result Value Ref Range    GFR If African American >60 >60 mL/min/1.73 m 2    GFR If Non African American >60 >60 mL/min/1.73 m 2       Fluids    Intake/Output Summary (Last 24 hours) at 10/10/18 1358  Last data filed at 10/10/18 0800   Gross per 24 hour   Intake                0 ml   Output             1550 ml   Net            -1550 ml       Core Measures & Quality Metrics  Radiology images reviewed and Labs reviewed  Bull catheter: No Bull        DVT prophylaxis - mechanical: SCDs  Ulcer prophylaxis: Not indicated        PRESTON Score  ETOH Screening    Assessment/Plan    Acute on chronic right-sided congestive heart failure (HCC)- (present on admission)   Assessment & Plan    Weight gain - 10 lbs in 48 hr  BNP > 18K  2-3 + pitting edema of BLE              Leukocytosis- (present on admission)   Assessment & Plan    WBC 23.7  No evidence of pneumonia on CT  UA negative  Denies abdominal pain        Elevated LFTs- (present on admission)   Assessment & Plan    Elevated AST  195, ALT  550, TB   0.7, ALK P 305.  Likely related to cardiac failure  Follow        Cholelithiasis   Assessment & Plan    No abdominal pain  Ultrasound - there are multiple gallstones present.   01/9 - HIDA  Gallbladder activity is confirmed by 30 minutes / No scintigraphic evidence of cholecystitis or biliary obstruction              Discussed patient condition with Family and Patient.    Patient seen, data reviewed and discussed.  Agree with assessment and plan.  CLEOPATRA

## 2018-10-11 ENCOUNTER — PATIENT OUTREACH (OUTPATIENT)
Dept: HEALTH INFORMATION MANAGEMENT | Facility: OTHER | Age: 83
End: 2018-10-11

## 2018-10-11 VITALS
WEIGHT: 194 LBS | DIASTOLIC BLOOD PRESSURE: 47 MMHG | HEART RATE: 81 BPM | BODY MASS INDEX: 31.18 KG/M2 | SYSTOLIC BLOOD PRESSURE: 96 MMHG | OXYGEN SATURATION: 90 % | HEIGHT: 66 IN | RESPIRATION RATE: 18 BRPM | TEMPERATURE: 98.1 F

## 2018-10-11 PROBLEM — E87.6 HYPOKALEMIA: Status: ACTIVE | Noted: 2018-10-11

## 2018-10-11 LAB
ALBUMIN SERPL BCP-MCNC: 3.6 G/DL (ref 3.2–4.9)
ALBUMIN/GLOB SERPL: 1.3 G/DL
ALP SERPL-CCNC: 215 U/L (ref 30–99)
ALT SERPL-CCNC: 239 U/L (ref 2–50)
ANION GAP SERPL CALC-SCNC: 10 MMOL/L (ref 0–11.9)
ANION GAP SERPL CALC-SCNC: 10 MMOL/L (ref 0–11.9)
ANION GAP SERPL CALC-SCNC: 11 MMOL/L (ref 0–11.9)
AST SERPL-CCNC: 47 U/L (ref 12–45)
BASOPHILS # BLD AUTO: 0.2 % (ref 0–1.8)
BASOPHILS # BLD: 0.04 K/UL (ref 0–0.12)
BILIRUB SERPL-MCNC: 1.7 MG/DL (ref 0.1–1.5)
BUN SERPL-MCNC: 36 MG/DL (ref 8–22)
BUN SERPL-MCNC: 43 MG/DL (ref 8–22)
BUN SERPL-MCNC: 44 MG/DL (ref 8–22)
CALCIUM SERPL-MCNC: 8.8 MG/DL (ref 8.5–10.5)
CALCIUM SERPL-MCNC: 9.2 MG/DL (ref 8.5–10.5)
CALCIUM SERPL-MCNC: 9.4 MG/DL (ref 8.5–10.5)
CHLORIDE SERPL-SCNC: 92 MMOL/L (ref 96–112)
CHLORIDE SERPL-SCNC: 93 MMOL/L (ref 96–112)
CHLORIDE SERPL-SCNC: 96 MMOL/L (ref 96–112)
CO2 SERPL-SCNC: 28 MMOL/L (ref 20–33)
CO2 SERPL-SCNC: 31 MMOL/L (ref 20–33)
CO2 SERPL-SCNC: 34 MMOL/L (ref 20–33)
CREAT SERPL-MCNC: 1.02 MG/DL (ref 0.5–1.4)
CREAT SERPL-MCNC: 1.21 MG/DL (ref 0.5–1.4)
CREAT SERPL-MCNC: 1.25 MG/DL (ref 0.5–1.4)
EKG IMPRESSION: NORMAL
EOSINOPHIL # BLD AUTO: 0.18 K/UL (ref 0–0.51)
EOSINOPHIL NFR BLD: 1 % (ref 0–6.9)
ERYTHROCYTE [DISTWIDTH] IN BLOOD BY AUTOMATED COUNT: 51.3 FL (ref 35.9–50)
GLOBULIN SER CALC-MCNC: 2.8 G/DL (ref 1.9–3.5)
GLUCOSE SERPL-MCNC: 109 MG/DL (ref 65–99)
GLUCOSE SERPL-MCNC: 116 MG/DL (ref 65–99)
GLUCOSE SERPL-MCNC: 77 MG/DL (ref 65–99)
HCT VFR BLD AUTO: 38.3 % (ref 42–52)
HGB BLD-MCNC: 13.2 G/DL (ref 14–18)
IMM GRANULOCYTES # BLD AUTO: 0.43 K/UL (ref 0–0.11)
IMM GRANULOCYTES NFR BLD AUTO: 2.5 % (ref 0–0.9)
LYMPHOCYTES # BLD AUTO: 1.62 K/UL (ref 1–4.8)
LYMPHOCYTES NFR BLD: 9.4 % (ref 22–41)
MCH RBC QN AUTO: 29.9 PG (ref 27–33)
MCHC RBC AUTO-ENTMCNC: 34.5 G/DL (ref 33.7–35.3)
MCV RBC AUTO: 86.7 FL (ref 81.4–97.8)
MONOCYTES # BLD AUTO: 1.89 K/UL (ref 0–0.85)
MONOCYTES NFR BLD AUTO: 10.9 % (ref 0–13.4)
NEUTROPHILS # BLD AUTO: 13.12 K/UL (ref 1.82–7.42)
NEUTROPHILS NFR BLD: 76 % (ref 44–72)
NRBC # BLD AUTO: 0 K/UL
NRBC BLD-RTO: 0 /100 WBC
PLATELET # BLD AUTO: 193 K/UL (ref 164–446)
PMV BLD AUTO: 14.2 FL (ref 9–12.9)
POTASSIUM SERPL-SCNC: 3.1 MMOL/L (ref 3.6–5.5)
POTASSIUM SERPL-SCNC: 5.1 MMOL/L (ref 3.6–5.5)
POTASSIUM SERPL-SCNC: 5.2 MMOL/L (ref 3.6–5.5)
PROT SERPL-MCNC: 6.4 G/DL (ref 6–8.2)
RBC # BLD AUTO: 4.42 M/UL (ref 4.7–6.1)
SODIUM SERPL-SCNC: 134 MMOL/L (ref 135–145)
SODIUM SERPL-SCNC: 135 MMOL/L (ref 135–145)
SODIUM SERPL-SCNC: 136 MMOL/L (ref 135–145)
WBC # BLD AUTO: 17.3 K/UL (ref 4.8–10.8)

## 2018-10-11 PROCEDURE — 85025 COMPLETE CBC W/AUTO DIFF WBC: CPT

## 2018-10-11 PROCEDURE — 99239 HOSP IP/OBS DSCHRG MGMT >30: CPT | Mod: GC | Performed by: INTERNAL MEDICINE

## 2018-10-11 PROCEDURE — 93005 ELECTROCARDIOGRAM TRACING: CPT | Performed by: STUDENT IN AN ORGANIZED HEALTH CARE EDUCATION/TRAINING PROGRAM

## 2018-10-11 PROCEDURE — 99233 SBSQ HOSP IP/OBS HIGH 50: CPT | Performed by: INTERNAL MEDICINE

## 2018-10-11 PROCEDURE — 80048 BASIC METABOLIC PNL TOTAL CA: CPT

## 2018-10-11 PROCEDURE — A9270 NON-COVERED ITEM OR SERVICE: HCPCS | Performed by: STUDENT IN AN ORGANIZED HEALTH CARE EDUCATION/TRAINING PROGRAM

## 2018-10-11 PROCEDURE — 700102 HCHG RX REV CODE 250 W/ 637 OVERRIDE(OP): Performed by: INTERNAL MEDICINE

## 2018-10-11 PROCEDURE — 700111 HCHG RX REV CODE 636 W/ 250 OVERRIDE (IP): Performed by: STUDENT IN AN ORGANIZED HEALTH CARE EDUCATION/TRAINING PROGRAM

## 2018-10-11 PROCEDURE — A9270 NON-COVERED ITEM OR SERVICE: HCPCS | Performed by: INTERNAL MEDICINE

## 2018-10-11 PROCEDURE — 700102 HCHG RX REV CODE 250 W/ 637 OVERRIDE(OP): Performed by: STUDENT IN AN ORGANIZED HEALTH CARE EDUCATION/TRAINING PROGRAM

## 2018-10-11 PROCEDURE — 93010 ELECTROCARDIOGRAM REPORT: CPT | Performed by: INTERNAL MEDICINE

## 2018-10-11 PROCEDURE — 36415 COLL VENOUS BLD VENIPUNCTURE: CPT

## 2018-10-11 PROCEDURE — 80053 COMPREHEN METABOLIC PANEL: CPT

## 2018-10-11 RX ORDER — TORSEMIDE 20 MG/1
100 TABLET ORAL DAILY
Qty: 50 TAB | Refills: 1 | Status: SHIPPED | OUTPATIENT
Start: 2018-10-11 | End: 2018-10-11

## 2018-10-11 RX ORDER — ACETAMINOPHEN 325 MG/1
650 TABLET ORAL EVERY 4 HOURS PRN
Status: DISCONTINUED | OUTPATIENT
Start: 2018-10-11 | End: 2018-10-11 | Stop reason: HOSPADM

## 2018-10-11 RX ORDER — TORSEMIDE 20 MG/1
80 TABLET ORAL DAILY
Qty: 30 TAB | Refills: 3 | Status: SHIPPED | OUTPATIENT
Start: 2018-10-11

## 2018-10-11 RX ORDER — TORSEMIDE 20 MG/1
80 TABLET ORAL DAILY
Qty: 50 TAB | Refills: 1 | Status: SHIPPED | OUTPATIENT
Start: 2018-10-11 | End: 2018-10-11

## 2018-10-11 RX ADMIN — POTASSIUM BICARBONATE 50 MEQ: 25 TABLET, EFFERVESCENT ORAL at 12:22

## 2018-10-11 RX ADMIN — BUDESONIDE AND FORMOTEROL FUMARATE DIHYDRATE 2 PUFF: 160; 4.5 AEROSOL RESPIRATORY (INHALATION) at 05:51

## 2018-10-11 RX ADMIN — ACETAMINOPHEN 650 MG: 325 TABLET, FILM COATED ORAL at 09:14

## 2018-10-11 RX ADMIN — ENOXAPARIN SODIUM 40 MG: 100 INJECTION SUBCUTANEOUS at 05:45

## 2018-10-11 RX ADMIN — ALLOPURINOL 100 MG: 100 TABLET ORAL at 05:44

## 2018-10-11 RX ADMIN — POTASSIUM BICARBONATE 50 MEQ: 25 TABLET, EFFERVESCENT ORAL at 10:05

## 2018-10-11 RX ADMIN — TORSEMIDE 100 MG: 100 TABLET ORAL at 05:44

## 2018-10-11 RX ADMIN — MAGNESIUM GLUCONATE 500 MG ORAL TABLET 400 MG: 500 TABLET ORAL at 05:44

## 2018-10-11 RX ADMIN — POTASSIUM BICARBONATE 50 MEQ: 25 TABLET, EFFERVESCENT ORAL at 08:01

## 2018-10-11 RX ADMIN — POTASSIUM BICARBONATE 50 MEQ: 25 TABLET, EFFERVESCENT ORAL at 05:45

## 2018-10-11 RX ADMIN — MAGNESIUM HYDROXIDE 30 ML: 400 SUSPENSION ORAL at 12:22

## 2018-10-11 RX ADMIN — FINASTERIDE 5 MG: 5 TABLET, FILM COATED ORAL at 05:44

## 2018-10-11 RX ADMIN — ASPIRIN 81 MG: 81 TABLET, COATED ORAL at 05:44

## 2018-10-11 ASSESSMENT — ENCOUNTER SYMPTOMS
CHEST TIGHTNESS: 0
APNEA: 0
WHEEZING: 0
COUGH: 0
CHOKING: 0
SHORTNESS OF BREATH: 0
STRIDOR: 0

## 2018-10-11 NOTE — ASSESSMENT & PLAN NOTE
2/2 loop diuretics   - replete aggressively, recheck K after 100 meq supplementation  - QTc was prolonged on EKG yesterday 503, today's 449  - supplement magnesium

## 2018-10-11 NOTE — PROGRESS NOTES
Internal Medicine Interval Note  Note Author: Kriss Corado M.D.     Name Thuy Guidry       1935   Age/Sex 82 y.o. male   MRN [id number]   Code Status DNR/DNI     After 5PM or if no immediate response to page, please call for cross-coverage  Attending/Team: Dr. Wilkins See Patient List for primary contact information  Call [phone] to page    1st Call - Day Intern (R1):   Dr. Ratliff 2nd Call - Day Sr. Resident (R2/R3):   Dr. Corado             Reason for interval visit  (Principal Problem)   ADCHF  Hypokalemia       Interval Problem Daily Status Update  (24 hours, problem oriented, brief subjective history, new lab/imaging data pertinent to that problem)   Pt is feeling better, has no new complain. Is eager to go home but understand his low potassium levels and want to stay till those are in safe limits. Able to tolerate the oral K lyte well.   Swelling is better after Iv diuretics, breathing is better. BM normal         ROS  Review of Systems   Constitutional: Negative for chills and fever.   Eyes: Negative for blurred vision and double vision.   Respiratory: Negative for cough and shortness of breath.    Cardiovascular: Negative for chest pain, orthopnea, leg swelling and PND.   Gastrointestinal: Negative for nausea and vomiting.   Genitourinary: Negative for dysuria, frequency, hematuria and urgency.       Disposition/Barriers to discharge:   Inpatient till K is in range of 4. Home with k~4    Consultants/Specialty  Dr. Lynch, Cardiology  Dr. Cantu, general surgery  PCP: Pcp Not In Computer      Quality Measures  Quality-Core Measures  Reviewed items::  Labs reviewed and Medications reviewed  Bull catheter::  No Bull  DVT prophylaxis pharmacological::  Enoxaparin (Lovenox)         Physical Exam       Vitals:    10/10/18 1605 10/10/18 2025 10/11/18 0048 10/11/18 0414   BP: 110/54 120/56 105/60 102/50   Pulse: 85 89 82 78   Resp:    Temp: 36.6 °C (97.9 °F) 36.9 °C (98.5  °F) 36.6 °C (97.8 °F) 36.6 °C (97.8 °F)   SpO2: 95% 91% 100% 98%   Weight:  88 kg (194 lb 0.1 oz)     Height:         Body mass index is 31.31 kg/m². Weight: 88 kg (194 lb 0.1 oz)  Oxygen Therapy:  Pulse Oximetry: 98 %, O2 (LPM): 0, O2 Delivery: None (Room Air)    Physical Exam  General; sitting on the bed, looks comfortable, no acute distress  HEENT: AT/NC, PERRLA, EOMI, neck supple, MMM  CHest: CTAB  CVS: RRR, S1S2, 5/6 harsh systolic murmur radiating to carotids. JVP +, + pedal edema 1+ on ankles   Abdomen: soft non tender  Neuro: a&O x 3, no FD  Extremity:+ pedal edema 1+ on ankles   Psych: mood and affect appropriate           Assessment/Plan     Hypokalemia   Assessment & Plan    2/2 loop diuretics   - replete aggressively, recheck K after 100 meq supplementation  - QTc was prolonged on EKG yesterday 503, today's 449  - supplement magnesium         Acute on chronic right-sided congestive heart failure (HCC)- (present on admission)   Assessment & Plan    Assessment: Patient received 80mg IV lasix twice and as of this morning has improved significantly. Echocardiogram was performed and per cardiology demonstrated new reduced ejection fraction, likely secondary to worsening valvular disease. He will likely need surgical intervention, however this is not necessary immediately and can be done as an outpatient with his cardiologist in [place]. He has severe aortic stenosis causing his murmur as well as a olivia-valvular leak. Medical management with ACE/ARB, spironolactone, and beta blocker would normally be indicated, however due to the mechanical etiology of his exacerbated heart failure this likely would not help and given his low-normal BPs might cause significant hypotension.    Transaminases decreasing and creatinine improved indicating that his transaminitis and decreased GFR were likely secondary to heart failure via congestion and decreased forward output respectively.     Plan:  Switched from home PO  lasix to Torsemide 100mg daily per cardiology recommendations- better bioavailability. F/u with OP cards   Pt was educated to maintain his weight- under 190 lbs   - differ ACE/ARB, BB for now, can f/u OP for the starting it when stable  Restart home potassium  Continue ASA  Daily weights  Low-sodium diet        Asthma   Assessment & Plan    Patient endorses history of asthma, says it has been improving and that he hardly ever uses his inhalers anymore. Continuing to offer home inhalers PRN. Imaging does demonstrate centrilobular emphysema and patient has a (remote) significant smoking history indicating likely underlying COPD.        Leukocytosis- (present on admission)   Assessment & Plan    Assessment: likely reactive trending down, no signs/symptoms of sepsis- CT chest- negative, UTI negative     Plan:  Follow outpatient        Elevated LFTs- (present on admission)   Assessment & Plan    Assessment: Improving after diuresis indicating that was likely secondary to vascular congestion.    Plan  Follow outpatient        Hyponatremia   Assessment & Plan    Resolved with diuresis        Gout   Assessment & Plan    Continue home allopurinol        Hypertension   Assessment & Plan    Currently not hypertensive        Benign prostatic hyperplasia   Assessment & Plan    Finasteride and tamsulosin substituted for home medications        Hyperlipidemia   Assessment & Plan    Continue simvastatin

## 2018-10-11 NOTE — PROGRESS NOTES
Cardiology Follow Up Progress Note    Date of Service  10/11/2018    Attending Physician  Jorge Wilkins M.D.    Chief Complaint   Worsening dyspnea. Weight up 10#    HPI  Thuy Guidry is a 82 y.o. male admitted 10/9/2018 with acute on chronic valvular heart failure.      HX of diastolic dysfunction, hypertensive heart disease, known mitral regurgitation, aortic valve and mitral valve replacement in 2013 (bioprosthetic valves), chronic kidney disease    Interim Events    10/11/18, wishes to go home, potassium remains low  10/10/18, LVEF 35-40% by echo, severe stenosis of the bioprosthetic mitral valve,, severely dilated right ventricle, severe aortic stenosis of the bioprosthetic valve  Review of Systems  Review of Systems   Respiratory: Negative for apnea, cough, choking, chest tightness, shortness of breath, wheezing and stridor.    Cardiovascular: Positive for leg swelling. Negative for chest pain.       Vital signs in last 24 hours  Temp:  [36.6 °C (97.8 °F)-36.9 °C (98.5 °F)] 36.6 °C (97.8 °F)  Pulse:  [76-89] 86  Resp:  [16-18] 18  BP: (102-120)/(50-60) 119/58    Physical Exam  Physical Exam   Constitutional: He is oriented to person, place, and time. He appears well-developed.   HENT:   Head: Normocephalic.   Eyes: Conjunctivae are normal.   Neck: No thyromegaly present.   Cardiovascular: Normal rate and regular rhythm.    Murmur heard.   Diastolic murmur is present   Pulses:       Carotid pulses are 2+ on the right side, and 2+ on the left side.       Radial pulses are 2+ on the right side, and 2+ on the left side.   Pulmonary/Chest: He has no wheezes.   Abdominal: Soft.   Neurological: He is alert and oriented to person, place, and time.   Skin: Skin is warm and dry.       Lab Review  Lab Results   Component Value Date/Time    WBC 17.3 (H) 10/11/2018 05:45 AM    RBC 4.42 (L) 10/11/2018 05:45 AM    HEMOGLOBIN 13.2 (L) 10/11/2018 05:45 AM    HEMATOCRIT 38.3 (L) 10/11/2018 05:45 AM    MCV 86.7 10/11/2018  05:45 AM    MCH 29.9 10/11/2018 05:45 AM    MCHC 34.5 10/11/2018 05:45 AM    MPV 14.2 (H) 10/11/2018 05:45 AM      Lab Results   Component Value Date/Time    SODIUM 135 10/11/2018 05:45 AM    POTASSIUM 3.1 (L) 10/11/2018 05:45 AM    CHLORIDE 96 10/11/2018 05:45 AM    CO2 28 10/11/2018 05:45 AM    GLUCOSE 116 (H) 10/11/2018 05:45 AM    BUN 36 (H) 10/11/2018 05:45 AM    CREATININE 1.02 10/11/2018 05:45 AM      Lab Results   Component Value Date/Time    ASTSGOT 47 (H) 10/11/2018 05:45 AM    ALTSGPT 239 (H) 10/11/2018 05:45 AM     Lab Results   Component Value Date/Time    TROPONINI 1.08 (H) 10/09/2018 04:47 PM       Recent Labs      10/09/18   0540   BNPBTYPENAT  [id number]           Assessment/Plan    Acute on chronic valvular systolic and diastolic heart failure  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 Daily  Potassium 3.1, being replaced, co he ntinue with Mg  I/O not recorded adequately  Standing weight 194#(per patient report dry weight ~  188# )  Plan to be discharged today and  follow-up with his cardiologist in [place]  Discharge on torsemide 100 mg and potassium replacement with repeat BMP next week  Will holf off of BB and ACE/ARB secondary to soft BP

## 2018-10-11 NOTE — HEART FAILURE PROGRAM
.Cardiovascular Nurse Navigator () Advanced Heart Failure Program Inpatient Progress Note:    Current plan per notes: replete potassium.     Currently scheduled HF f/u appointment:  DR FRENCH  On 10/15/2018  Please check in at 415pm for your appointment thank you   [address], [place] [address]  [phone]   Jeison Knox MD, [facility]  On 10/17/2018  Please check in at 945am for your appointment thank you   [address], [place] [address]  [phone]         If it starts to look like patient won't discharge by Saturday, 10/13, this appointment should be pushed out to take place within seven calendar days of newly anticipated discharge date.     Hospital schedulers are here seven days a week, [phone] and can be reached at extension [phone], they will handle appointment adjustments for you if you call them!    As a reminder, the HF Care Bundle/Core Measures consist of:      1. A seven calendar day HF f/u appointment on the AVS, unless patient goes to SNF, inpatient rehab, or d/c with hospice.  2. HF discharge instructions discussed with patient and on the AVS and HF patient education packet provided and discussed  3. Appropriate medications for EF <40%: ACE/ARB or reason from MD why not prescribing in a note.  4. Documentation of left ventricular systolic assessment (echo or angio) or reason from MD why this is not done or will be done outpatient in a note.    Thank you and please call with questions Alida MARTIN

## 2018-10-12 NOTE — DISCHARGE INSTRUCTIONS
Discharge Instructions    Discharged to home by car with relative. Discharged via wheelchair, hospital escort: Refused.  Special equipment needed: Not Applicable    Be sure to schedule a follow-up appointment with your primary care doctor or any specialists as instructed.     Discharge Plan:   Influenza Vaccine Indication: Indicated: 65 years and older  Influenza Vaccine Given - only chart on this line when given: Influenza Vaccine Given (See MAR)    I understand that a diet low in cholesterol, fat, and sodium is recommended for good health. Unless I have been given specific instructions below for another diet, I accept this instruction as my diet prescription.   Other diet: cardiac    *Go to PCP within three days and let them draw a BMP to recheck your potassium     Special Instructions:   HF Patient Discharge Instructions  · Monitor your weight daily, and maintain a weight chart, to track your weight changes.   · Activity as tolerated, unless your Doctor has ordered otherwise. Other activity order: as tolerated.  · Follow a low fat, low cholesterol, low salt diet unless instructed otherwise by your Doctor. Read the labels on the back of food products and track your intake of fat, cholesterol and salt.   · Fluid Restriction No. If a Fluid Restriction has been ordered by your Doctor, measure fluids with a measuring cup to ensure that you are not exceeding the restriction.   · No smoking.  · Oxygen No. If your Doctor has ordered that you wear Oxygen at home, it is important to wear it as ordered.  · Did you receive an explanation from staff on the importance of taking each of your medications and why it is necessary to keep taking them unless your doctor says to stop? Yes  · Were all of your questions answered about how to manage your heart failure and what to do if you have increased signs and symptoms after you go home? Yes  · Do you feel like your heart failure care team involved you in the care treatment plan and  allowed you to make decisions regarding your care while in the hospital and addressed any discharge needs you might have? Yes    See the educational handout provided at discharge for more information on monitoring your daily weight, activity and diet. This also explains more about Heart Failure, symptoms of a flare-up and some of the tests that you have undergone.     Warning Signs of a Flare-Up include:  · Swelling in the ankles or lower legs.  · Shortness of breath, while at rest, or while doing normal activities.   · Shortness of breath at night when in bed, or coughing in bed.   · Requiring more pillows to sleep at night, or needing to sit up at night to sleep.  · Feeling weak, dizzy or fatigued.     When to call your Doctor:  · Call [facility] seven days a week from 8:00 a.m. to 8:00 p.m. for medical questions [phone].  · Call your Primary Care Physician or Cardiologist if:   1. You experience any pain radiating to your jaw or neck.  2. You have any difficulty breathing.  3. You experience weight gain of 3 lbs in a day or 5 lbs in a week.   4. You feel any palpitations or irregular heartbeats.  5. You become dizzy or lose consciousness.   If you have had an angiogram or had a pacemaker or AICD placed, and experience:  1. Bleeding, drainage or swelling at the surgical / puncture site.  2. Fever greater than 100.0 F  3. Shock from internal defibrillator.  4. Cool and / or numb extremities.      · Is patient discharged on Warfarin / Coumadin?   No     Torsemide tablets  What is this medicine?  TORSEMIDE (TORE se mide) is a diuretic. It helps you make more urine and to lose salt and excess water from your body. This medicine is used to treat high blood pressure, and edema or swelling from heart, kidney, or liver disease.  This medicine may be used for other purposes; ask your health care provider or pharmacist if you have questions.  COMMON BRAND NAME(S): Demadex  What should I tell my health care  provider before I take this medicine?  They need to know if you have any of these conditions:  -abnormal blood electrolytes  -diabetes  -gout  -heart disease  -kidney disease  -liver disease  -small amounts of urine, or difficulty passing urine  -an unusual or allergic reaction to torsemide, sulfa drugs, other medicines, foods, dyes, or preservatives  -pregnant or trying to get pregnant  -breast-feeding  How should I use this medicine?  Take this medicine by mouth with a glass of water. Follow the directions on the prescription label. You may take this medicine with or without food. If it upsets your stomach, take it with food or milk. Do not take your medicine more often than directed. Remember that you will need to pass more urine after taking this medicine. Do not take your medicine at a time of day that will cause you problems. Do not take at bedtime.  Talk to your pediatrician regarding the use of this medicine in children. Special care may be needed.  Overdosage: If you think you have taken too much of this medicine contact a poison control center or emergency room at once.  NOTE: This medicine is only for you. Do not share this medicine with others.  What if I miss a dose?  If you miss a dose, take it as soon as you can. If it is almost time for your next dose, take only that dose. Do not take double or extra doses.  What may interact with this medicine?  -alcohol  -certain antibiotics given by injection  certain heart medicines like digoxin  -diuretics  -lithium  -medicines for diabetes  -medicines for blood pressure  -medicines for cholesterol like cholestyramine  -medicines that relax muscles for surgery  -NSAIDs, medicines for pain and inflammation, like ibuprofen or naproxen  -OTC supplements like ginseng and ephedra  -probenecid  -steroid medicines like prednisone or cortisone  This list may not describe all possible interactions. Give your health care provider a list of all the medicines, herbs,  non-prescription drugs, or dietary supplements you use. Also tell them if you smoke, drink alcohol, or use illegal drugs. Some items may interact with your medicine.  What should I watch for while using this medicine?  Visit your doctor or health care professional for regular checks on your progress. Check your blood pressure regularly. Ask your doctor or health care professional what your blood pressure should be, and when you should contact him or her. If you are a diabetic, check your blood sugar as directed.  You may need to be on a special diet while taking this medicine. Check with your doctor. Also, ask how many glasses of fluid you need to drink a day. You must not get dehydrated.  You may get drowsy or dizzy. Do not drive, use machinery, or do anything that needs mental alertness until you know how this drug affects you. Do not stand or sit up quickly, especially if you are an older patient. This reduces the risk of dizzy or fainting spells. Alcohol can make you more drowsy and dizzy. Avoid alcoholic drinks.  What side effects may I notice from receiving this medicine?  Side effects that you should report to your doctor or health care professional as soon as possible:  -allergic reactions such as skin rash or itching, hives, swelling of the lips, mouth, tongue or throat  -blood in urine or stool  -dry mouth  -hearing loss or ringing in the ears  -irregular heartbeat  -muscle pain, weakness or cramps  -pain or difficulty passing urine  -unusually weak or tired  -vomiting or diarrhea  Side effects that usually do not require medical attention (report to your doctor or health care professional if they continue or are bothersome):  -dizzy or lightheaded  -headache  -increased thirst  -passing large amounts of urine  -sexual difficulties  -stomach pain, upset or nausea  This list may not describe all possible side effects. Call your doctor for medical advice about side effects. You may report side effects to FDA  at 0-322-GJP-3456.  Where should I keep my medicine?  Keep out of the reach of children.  Store at room temperature between 15 and 30 degrees C (59 and 86 degrees F). Throw away any unused medicine after the expiration date.  NOTE: This sheet is a summary. It may not cover all possible information. If you have questions about this medicine, talk to your doctor, pharmacist, or health care provider.  © 2018 [employer] (2009-09-03 11:35:45)      Depression / Suicide Risk    As you are discharged from this [facility] facility, it is important to learn how to keep safe from harming yourself.    Recognize the warning signs:  · Abrupt changes in personality, positive or negative- including increase in energy   · Giving away possessions  · Change in eating patterns- significant weight changes-  positive or negative  · Change in sleeping patterns- unable to sleep or sleeping all the time   · Unwillingness or inability to communicate  · Depression  · Unusual sadness, discouragement and loneliness  · Talk of wanting to die  · Neglect of personal appearance   · Rebelliousness- reckless behavior  · Withdrawal from people/activities they love  · Confusion- inability to concentrate     If you or a loved one observes any of these behaviors or has concerns about self-harm, here's what you can do:  · Talk about it- your feelings and reasons for harming yourself  · Remove any means that you might use to hurt yourself (examples: pills, rope, extension cords, firearm)  · Get professional help from the community (Mental Health, Substance Abuse, psychological counseling)  · Do not be alone:Call your Safe Contact- someone whom you trust who will be there for you.  · Call your local CRISIS HOTLINE 499-6939 or 346-929-6080  · Call your local Children's Mobile Crisis Response Team [place] (819) 742-9968 or www.Zoodak  · Call the toll free National Suicide Prevention Hotlines   · National Suicide Prevention  Lifeline 717-504-IAPZ (2373)  · National [place] Line Network 800-SUICIDE (970-8687)

## 2018-10-12 NOTE — PROGRESS NOTES
Discharge Summary    Educated patient on new medications, up coming appointments, and s/s to look for when returning to the ER. Patient verbalized understanding. PIVs removed with no s/s of bleeding or infection at site. Vitals WNL. Escorted patient downstairs via wheelchair for discharge home.